# Patient Record
Sex: MALE | Race: WHITE | NOT HISPANIC OR LATINO | Employment: STUDENT | URBAN - METROPOLITAN AREA
[De-identification: names, ages, dates, MRNs, and addresses within clinical notes are randomized per-mention and may not be internally consistent; named-entity substitution may affect disease eponyms.]

---

## 2018-01-01 ENCOUNTER — EXTERNAL RECORD (OUTPATIENT)
Dept: OTHER | Age: 23
End: 2018-01-01

## 2018-07-30 ENCOUNTER — TELEPHONE (OUTPATIENT)
Dept: CARDIOLOGY | Age: 23
End: 2018-07-30

## 2018-07-31 ENCOUNTER — HISTORICAL DOCUMENTS (OUTPATIENT)
Dept: OTHER | Age: 23
End: 2018-07-31

## 2018-08-02 ENCOUNTER — CLINICAL ABSTRACT (OUTPATIENT)
Dept: ELECTROPHYSIOLOGY | Age: 23
End: 2018-08-02

## 2018-08-06 ENCOUNTER — TELEPHONE (OUTPATIENT)
Dept: ELECTROPHYSIOLOGY | Age: 23
End: 2018-08-06

## 2018-08-07 ENCOUNTER — OFFICE VISIT (OUTPATIENT)
Dept: ELECTROPHYSIOLOGY | Age: 23
End: 2018-08-07
Attending: INTERNAL MEDICINE

## 2018-08-07 VITALS
WEIGHT: 133.6 LBS | DIASTOLIC BLOOD PRESSURE: 82 MMHG | OXYGEN SATURATION: 97 % | HEART RATE: 75 BPM | HEIGHT: 64 IN | BODY MASS INDEX: 22.81 KG/M2 | SYSTOLIC BLOOD PRESSURE: 102 MMHG

## 2018-08-07 DIAGNOSIS — G90.A POTS (POSTURAL ORTHOSTATIC TACHYCARDIA SYNDROME): Primary | ICD-10-CM

## 2018-08-07 PROCEDURE — 99204 OFFICE O/P NEW MOD 45 MIN: CPT | Performed by: INTERNAL MEDICINE

## 2018-08-07 PROCEDURE — 93010 ELECTROCARDIOGRAM REPORT: CPT | Performed by: INTERNAL MEDICINE

## 2018-08-07 PROCEDURE — 99211 OFF/OP EST MAY X REQ PHY/QHP: CPT | Performed by: INTERNAL MEDICINE

## 2018-08-07 RX ORDER — DICYCLOMINE HCL 20 MG
20 TABLET ORAL EVERY 6 HOURS PRN
COMMUNITY
End: 2020-04-15 | Stop reason: ALTCHOICE

## 2018-08-07 RX ORDER — ONDANSETRON 4 MG/1
4 TABLET, ORALLY DISINTEGRATING ORAL EVERY 8 HOURS PRN
COMMUNITY
End: 2021-09-13

## 2018-08-07 RX ORDER — ACETAMINOPHEN 500 MG
1000 TABLET ORAL EVERY 6 HOURS PRN
COMMUNITY

## 2018-08-07 RX ORDER — PANTOPRAZOLE SODIUM 40 MG/1
40 TABLET, DELAYED RELEASE ORAL 2 TIMES DAILY
COMMUNITY
End: 2020-04-15 | Stop reason: ALTCHOICE

## 2018-08-07 RX ORDER — ACEBUTOLOL HYDROCHLORIDE 200 MG/1
200 CAPSULE ORAL DAILY
COMMUNITY
End: 2018-08-07 | Stop reason: ALTCHOICE

## 2018-08-23 ENCOUNTER — APPOINTMENT (OUTPATIENT)
Dept: CARDIOLOGY | Age: 23
End: 2018-08-23
Attending: INTERNAL MEDICINE

## 2018-08-24 ENCOUNTER — HOSPITAL ENCOUNTER (OUTPATIENT)
Age: 23
Discharge: HOME | End: 2018-08-24
Attending: EMERGENCY MEDICINE

## 2018-08-24 VITALS
DIASTOLIC BLOOD PRESSURE: 87 MMHG | WEIGHT: 136.69 LBS | OXYGEN SATURATION: 98 % | RESPIRATION RATE: 16 BRPM | BODY MASS INDEX: 23.34 KG/M2 | HEART RATE: 62 BPM | TEMPERATURE: 98.6 F | HEIGHT: 64 IN | SYSTOLIC BLOOD PRESSURE: 135 MMHG

## 2018-08-24 DIAGNOSIS — R35.0 URINARY FREQUENCY: Primary | ICD-10-CM

## 2018-08-24 LAB
APPEARANCE UR: CLEAR
BILIRUB UR QL STRIP: NEGATIVE
COLOR UR: YELLOW
GLUCOSE BLDC GLUCOMTR-MCNC: 96 MG/DL (ref 65–99)
GLUCOSE UR STRIP-MCNC: NEGATIVE MG/DL
HGB UR QL STRIP: NEGATIVE
KETONES UR STRIP-MCNC: NEGATIVE MG/DL
LEUKOCYTE ESTERASE UR QL STRIP: NEGATIVE
NITRITE UR QL STRIP: NEGATIVE
PH UR STRIP: 7.5 UNITS (ref 5–7)
PROT UR STRIP-MCNC: NEGATIVE MG/DL
SP GR UR STRIP: 1.01 (ref 1–1.03)
SPECIMEN SOURCE: ABNORMAL
UROBILINOGEN UR STRIP-MCNC: 1 MG/DL (ref 0–1)

## 2018-08-24 PROCEDURE — 99211 OFF/OP EST MAY X REQ PHY/QHP: CPT

## 2018-08-24 PROCEDURE — 81003 URINALYSIS AUTO W/O SCOPE: CPT

## 2018-08-24 PROCEDURE — 99202 OFFICE O/P NEW SF 15 MIN: CPT | Performed by: EMERGENCY MEDICINE

## 2018-08-24 PROCEDURE — 82962 GLUCOSE BLOOD TEST: CPT

## 2018-08-24 ASSESSMENT — ENCOUNTER SYMPTOMS
VOMITING: 0
ADENOPATHY: 0
NAUSEA: 0
HEADACHES: 0
SORE THROAT: 0
DIARRHEA: 0
DIZZINESS: 0
CONFUSION: 0
COUGH: 0
SHORTNESS OF BREATH: 0
ABDOMINAL PAIN: 0
FEVER: 0

## 2018-08-24 ASSESSMENT — PAIN SCALES - GENERAL
PAINLEVEL_OUTOF10: 0
PAINLEVEL_OUTOF10: 0

## 2018-09-07 ENCOUNTER — TELEPHONE (OUTPATIENT)
Dept: ELECTROPHYSIOLOGY | Age: 23
End: 2018-09-07

## 2018-09-24 ENCOUNTER — TELEPHONE (OUTPATIENT)
Dept: ELECTROPHYSIOLOGY | Age: 23
End: 2018-09-24

## 2018-09-24 RX ORDER — FLUDROCORTISONE ACETATE 0.1 MG/1
0.1 TABLET ORAL DAILY
Qty: 30 TABLET | Refills: 5 | Status: SHIPPED | OUTPATIENT
Start: 2018-09-24 | End: 2019-03-11 | Stop reason: SDUPTHER

## 2018-10-22 ENCOUNTER — WALK IN (OUTPATIENT)
Dept: URGENT CARE | Age: 23
End: 2018-10-22

## 2018-10-22 DIAGNOSIS — R30.0 DYSURIA: ICD-10-CM

## 2018-10-22 DIAGNOSIS — N39.0 ACUTE UTI: Primary | ICD-10-CM

## 2018-10-22 LAB
APPEARANCE UR: NORMAL
BILIRUB UR QL: NORMAL
COLOR UR: NORMAL
GLUCOSE UR-MCNC: NORMAL MG/DL
HGB UR QL: NORMAL
KETONES UR-MCNC: NORMAL MG/DL
LEUKOCYTE ESTERASE UR QL STRIP: NORMAL
NITRITE UR QL: NORMAL
PH UR: 6 [PH]
PROT UR QL: NORMAL
SP GR UR: 1.01
SPECIMEN SOURCE: NORMAL
UROBILINOGEN UR QL: 0.2

## 2018-10-22 PROCEDURE — 81002 URINALYSIS NONAUTO W/O SCOPE: CPT | Performed by: FAMILY MEDICINE

## 2018-10-22 PROCEDURE — 87086 URINE CULTURE/COLONY COUNT: CPT | Performed by: INTERNAL MEDICINE

## 2018-10-22 PROCEDURE — 99213 OFFICE O/P EST LOW 20 MIN: CPT | Performed by: FAMILY MEDICINE

## 2018-10-22 RX ORDER — SULFAMETHOXAZOLE AND TRIMETHOPRIM 800; 160 MG/1; MG/1
1 TABLET ORAL 2 TIMES DAILY
Qty: 20 TABLET | Refills: 0 | Status: SHIPPED | OUTPATIENT
Start: 2018-10-22 | End: 2019-01-08 | Stop reason: ALTCHOICE

## 2018-10-22 ASSESSMENT — PAIN SCALES - GENERAL: PAINLEVEL: 3-4

## 2018-10-24 ENCOUNTER — TELEPHONE (OUTPATIENT)
Dept: URGENT CARE | Age: 23
End: 2018-10-24

## 2018-10-24 LAB
BACTERIA UR CULT: NORMAL
REPORT STATUS (RPT): NORMAL
SPECIMEN SOURCE: NORMAL

## 2019-01-01 ENCOUNTER — EXTERNAL RECORD (OUTPATIENT)
Dept: OTHER | Age: 24
End: 2019-01-01

## 2019-01-07 ENCOUNTER — CLINICAL ABSTRACT (OUTPATIENT)
Dept: ELECTROPHYSIOLOGY | Age: 24
End: 2019-01-07

## 2019-01-08 ENCOUNTER — OFFICE VISIT (OUTPATIENT)
Dept: ELECTROPHYSIOLOGY | Age: 24
End: 2019-01-08
Attending: INTERNAL MEDICINE

## 2019-01-08 VITALS
HEART RATE: 79 BPM | OXYGEN SATURATION: 97 % | BODY MASS INDEX: 23.05 KG/M2 | HEIGHT: 64 IN | SYSTOLIC BLOOD PRESSURE: 116 MMHG | WEIGHT: 135 LBS | DIASTOLIC BLOOD PRESSURE: 86 MMHG

## 2019-01-08 DIAGNOSIS — G90.A POTS (POSTURAL ORTHOSTATIC TACHYCARDIA SYNDROME): ICD-10-CM

## 2019-01-08 PROCEDURE — 99214 OFFICE O/P EST MOD 30 MIN: CPT | Performed by: INTERNAL MEDICINE

## 2019-01-08 PROCEDURE — 99211 OFF/OP EST MAY X REQ PHY/QHP: CPT | Performed by: INTERNAL MEDICINE

## 2019-01-08 RX ORDER — POLYETHYLENE GLYCOL 3350 17 G/17G
17 POWDER, FOR SOLUTION ORAL 2 TIMES DAILY
COMMUNITY
End: 2020-04-15

## 2019-03-04 ENCOUNTER — E-ADVICE (OUTPATIENT)
Dept: ELECTROPHYSIOLOGY | Age: 24
End: 2019-03-04

## 2019-03-04 DIAGNOSIS — R60.0 LOWER EXTREMITY EDEMA: Primary | ICD-10-CM

## 2019-03-04 DIAGNOSIS — R06.01 ORTHOPNEA: ICD-10-CM

## 2019-03-11 ENCOUNTER — E-ADVICE (OUTPATIENT)
Dept: ELECTROPHYSIOLOGY | Age: 24
End: 2019-03-11

## 2019-03-11 RX ORDER — FLUDROCORTISONE ACETATE 0.1 MG/1
0.05 TABLET ORAL DAILY
Qty: 15 TABLET | Refills: 5 | Status: SHIPPED | OUTPATIENT
Start: 2019-03-11 | End: 2019-06-18 | Stop reason: ALTCHOICE

## 2019-03-12 ENCOUNTER — TELEPHONE (OUTPATIENT)
Dept: CARDIOLOGY | Age: 24
End: 2019-03-12

## 2019-03-13 ENCOUNTER — TELEPHONE (OUTPATIENT)
Dept: ELECTROPHYSIOLOGY | Age: 24
End: 2019-03-13

## 2019-03-20 ENCOUNTER — E-ADVICE (OUTPATIENT)
Dept: ELECTROPHYSIOLOGY | Age: 24
End: 2019-03-20

## 2019-03-31 ENCOUNTER — WALK IN (OUTPATIENT)
Dept: URGENT CARE | Age: 24
End: 2019-03-31

## 2019-03-31 VITALS
HEART RATE: 90 BPM | HEIGHT: 64 IN | TEMPERATURE: 98.4 F | RESPIRATION RATE: 18 BRPM | BODY MASS INDEX: 23.39 KG/M2 | DIASTOLIC BLOOD PRESSURE: 82 MMHG | WEIGHT: 137 LBS | OXYGEN SATURATION: 99 % | SYSTOLIC BLOOD PRESSURE: 110 MMHG

## 2019-03-31 DIAGNOSIS — F07.81 POST CONCUSSION SYNDROME: Primary | ICD-10-CM

## 2019-03-31 PROCEDURE — 99213 OFFICE O/P EST LOW 20 MIN: CPT | Performed by: PHYSICIAN ASSISTANT

## 2019-04-10 ENCOUNTER — WALK IN (OUTPATIENT)
Dept: URGENT CARE | Age: 24
End: 2019-04-10

## 2019-04-10 DIAGNOSIS — F07.81 POSTCONCUSSION SYNDROME: Primary | ICD-10-CM

## 2019-04-10 PROCEDURE — 99214 OFFICE O/P EST MOD 30 MIN: CPT | Performed by: FAMILY MEDICINE

## 2019-04-10 ASSESSMENT — PAIN SCALES - GENERAL: PAINLEVEL: 5-6

## 2019-04-10 ASSESSMENT — ENCOUNTER SYMPTOMS: HEADACHES: 1

## 2019-04-12 ENCOUNTER — HOSPITAL ENCOUNTER (OUTPATIENT)
Dept: REHABILITATION | Age: 24
Discharge: STILL ADMITTED OR ADMINISTRATIVE | End: 2019-04-12
Attending: FAMILY MEDICINE

## 2019-04-12 ENCOUNTER — CLINICAL ABSTRACT (OUTPATIENT)
Dept: ELECTROPHYSIOLOGY | Age: 24
End: 2019-04-12

## 2019-04-12 DIAGNOSIS — F07.81 POSTCONCUSSION SYNDROME: ICD-10-CM

## 2019-04-12 PROCEDURE — 10004173 HB COUNTER-THERAPY VISIT PT: Performed by: PHYSICAL THERAPIST

## 2019-04-12 PROCEDURE — 97162 PT EVAL MOD COMPLEX 30 MIN: CPT | Performed by: PHYSICAL THERAPIST

## 2019-04-12 PROCEDURE — 97140 MANUAL THERAPY 1/> REGIONS: CPT | Performed by: PHYSICAL THERAPIST

## 2019-04-12 PROCEDURE — 97110 THERAPEUTIC EXERCISES: CPT | Performed by: PHYSICAL THERAPIST

## 2019-04-18 ENCOUNTER — HOSPITAL ENCOUNTER (OUTPATIENT)
Dept: REHABILITATION | Age: 24
Discharge: STILL ADMITTED OR ADMINISTRATIVE | End: 2019-04-18
Attending: FAMILY MEDICINE

## 2019-04-18 PROCEDURE — 97112 NEUROMUSCULAR REEDUCATION: CPT | Performed by: PHYSICAL THERAPIST

## 2019-04-18 PROCEDURE — 10004173 HB COUNTER-THERAPY VISIT PT: Performed by: PHYSICAL THERAPIST

## 2019-04-18 PROCEDURE — 97140 MANUAL THERAPY 1/> REGIONS: CPT | Performed by: PHYSICAL THERAPIST

## 2019-04-19 ENCOUNTER — OFFICE VISIT (OUTPATIENT)
Dept: ELECTROPHYSIOLOGY | Age: 24
End: 2019-04-19
Attending: INTERNAL MEDICINE

## 2019-04-19 VITALS
HEIGHT: 64 IN | BODY MASS INDEX: 23.76 KG/M2 | HEART RATE: 100 BPM | DIASTOLIC BLOOD PRESSURE: 78 MMHG | SYSTOLIC BLOOD PRESSURE: 112 MMHG | WEIGHT: 139.2 LBS

## 2019-04-19 DIAGNOSIS — G90.A POTS (POSTURAL ORTHOSTATIC TACHYCARDIA SYNDROME): ICD-10-CM

## 2019-04-19 PROCEDURE — 99213 OFFICE O/P EST LOW 20 MIN: CPT | Performed by: INTERNAL MEDICINE

## 2019-04-19 PROCEDURE — 99211 OFF/OP EST MAY X REQ PHY/QHP: CPT | Performed by: INTERNAL MEDICINE

## 2019-04-24 ENCOUNTER — APPOINTMENT (OUTPATIENT)
Dept: REHABILITATION | Age: 24
End: 2019-04-24
Attending: FAMILY MEDICINE

## 2019-05-03 ENCOUNTER — HOSPITAL ENCOUNTER (OUTPATIENT)
Dept: REHABILITATION | Age: 24
Discharge: STILL ADMITTED OR ADMINISTRATIVE | End: 2019-05-03
Attending: FAMILY MEDICINE

## 2019-05-03 PROCEDURE — 97140 MANUAL THERAPY 1/> REGIONS: CPT | Performed by: PHYSICAL THERAPIST

## 2019-05-03 PROCEDURE — 97112 NEUROMUSCULAR REEDUCATION: CPT | Performed by: PHYSICAL THERAPIST

## 2019-05-03 PROCEDURE — 10004173 HB COUNTER-THERAPY VISIT PT: Performed by: PHYSICAL THERAPIST

## 2019-05-10 ENCOUNTER — HOSPITAL ENCOUNTER (OUTPATIENT)
Dept: REHABILITATION | Age: 24
Discharge: STILL ADMITTED OR ADMINISTRATIVE | End: 2019-05-10
Attending: FAMILY MEDICINE

## 2019-05-10 PROCEDURE — 97140 MANUAL THERAPY 1/> REGIONS: CPT | Performed by: PHYSICAL THERAPIST

## 2019-05-10 PROCEDURE — 97110 THERAPEUTIC EXERCISES: CPT | Performed by: PHYSICAL THERAPIST

## 2019-05-10 PROCEDURE — 10004173 HB COUNTER-THERAPY VISIT PT: Performed by: PHYSICAL THERAPIST

## 2019-06-13 ENCOUNTER — E-ADVICE (OUTPATIENT)
Dept: ELECTROPHYSIOLOGY | Age: 24
End: 2019-06-13

## 2019-06-17 ENCOUNTER — E-ADVICE (OUTPATIENT)
Dept: ELECTROPHYSIOLOGY | Age: 24
End: 2019-06-17

## 2019-06-17 RX ORDER — MIDODRINE HYDROCHLORIDE 2.5 MG/1
2.5 TABLET ORAL 3 TIMES DAILY
Qty: 90 TABLET | Refills: 0 | Status: SHIPPED | OUTPATIENT
Start: 2019-06-17 | End: 2019-06-27 | Stop reason: SDUPTHER

## 2019-06-27 RX ORDER — MIDODRINE HYDROCHLORIDE 2.5 MG/1
2.5 TABLET ORAL 3 TIMES DAILY
Qty: 90 TABLET | Refills: 6 | Status: SHIPPED | OUTPATIENT
Start: 2019-06-27 | End: 2020-05-18 | Stop reason: SDUPTHER

## 2019-07-30 ENCOUNTER — TRANSFERRED RECORDS (OUTPATIENT)
Dept: HEALTH INFORMATION MANAGEMENT | Facility: CLINIC | Age: 24
End: 2019-07-30

## 2019-08-12 ENCOUNTER — TRANSFERRED RECORDS (OUTPATIENT)
Dept: HEALTH INFORMATION MANAGEMENT | Facility: CLINIC | Age: 24
End: 2019-08-12

## 2019-08-21 ENCOUNTER — TELEPHONE (OUTPATIENT)
Dept: ELECTROPHYSIOLOGY | Age: 24
End: 2019-08-21

## 2019-09-05 ENCOUNTER — TELEPHONE (OUTPATIENT)
Dept: ELECTROPHYSIOLOGY | Age: 24
End: 2019-09-05

## 2019-11-27 ENCOUNTER — OFFICE VISIT (OUTPATIENT)
Dept: ELECTROPHYSIOLOGY | Age: 24
End: 2019-11-27
Attending: NURSE PRACTITIONER

## 2019-11-27 VITALS
HEIGHT: 64 IN | SYSTOLIC BLOOD PRESSURE: 108 MMHG | WEIGHT: 142.6 LBS | DIASTOLIC BLOOD PRESSURE: 60 MMHG | HEART RATE: 91 BPM | BODY MASS INDEX: 24.34 KG/M2 | OXYGEN SATURATION: 97 %

## 2019-11-27 DIAGNOSIS — R55 SYNCOPE, UNSPECIFIED SYNCOPE TYPE: ICD-10-CM

## 2019-11-27 PROCEDURE — 99211 OFF/OP EST MAY X REQ PHY/QHP: CPT | Performed by: NURSE PRACTITIONER

## 2019-11-27 PROCEDURE — 93005 ELECTROCARDIOGRAM TRACING: CPT | Performed by: NURSE PRACTITIONER

## 2019-11-27 PROCEDURE — 99214 OFFICE O/P EST MOD 30 MIN: CPT | Performed by: NURSE PRACTITIONER

## 2019-11-27 PROCEDURE — 93010 ELECTROCARDIOGRAM REPORT: CPT | Performed by: NURSE PRACTITIONER

## 2019-11-27 RX ORDER — CIPROFLOXACIN 500 MG/1
500 TABLET, FILM COATED ORAL 2 TIMES DAILY
COMMUNITY
Start: 2019-11-27 | End: 2020-04-15

## 2019-11-27 RX ORDER — VANCOMYCIN HYDROCHLORIDE 125 MG/1
125 CAPSULE ORAL 4 TIMES DAILY
COMMUNITY
Start: 2019-11-27 | End: 2019-12-11

## 2019-11-27 RX ORDER — CHOLESTYRAMINE LIGHT 4 G/5.7G
4 POWDER, FOR SUSPENSION ORAL 2 TIMES DAILY WITH MEALS
COMMUNITY

## 2019-12-14 ENCOUNTER — IMAGING SERVICES (OUTPATIENT)
Dept: GENERAL RADIOLOGY | Age: 24
End: 2019-12-14
Attending: PHYSICIAN ASSISTANT

## 2019-12-14 ENCOUNTER — WALK IN (OUTPATIENT)
Dept: URGENT CARE | Age: 24
End: 2019-12-14

## 2019-12-14 ENCOUNTER — TELEPHONE (OUTPATIENT)
Dept: URGENT CARE | Age: 24
End: 2019-12-14

## 2019-12-14 VITALS
WEIGHT: 135 LBS | TEMPERATURE: 98.5 F | DIASTOLIC BLOOD PRESSURE: 80 MMHG | RESPIRATION RATE: 16 BRPM | HEIGHT: 64 IN | HEART RATE: 108 BPM | OXYGEN SATURATION: 97 % | BODY MASS INDEX: 23.05 KG/M2 | SYSTOLIC BLOOD PRESSURE: 120 MMHG

## 2019-12-14 DIAGNOSIS — R05.9 COUGH: ICD-10-CM

## 2019-12-14 DIAGNOSIS — J20.8 VIRAL BRONCHITIS: ICD-10-CM

## 2019-12-14 DIAGNOSIS — R05.9 COUGH: Primary | ICD-10-CM

## 2019-12-14 PROCEDURE — 99214 OFFICE O/P EST MOD 30 MIN: CPT | Performed by: PHYSICIAN ASSISTANT

## 2019-12-14 PROCEDURE — 71046 X-RAY EXAM CHEST 2 VIEWS: CPT | Performed by: RADIOLOGY

## 2019-12-14 RX ORDER — AZITHROMYCIN 250 MG/1
TABLET, FILM COATED ORAL
Qty: 6 TABLET | Refills: 0 | Status: SHIPPED | OUTPATIENT
Start: 2019-12-14 | End: 2019-12-19

## 2020-04-06 ENCOUNTER — E-ADVICE (OUTPATIENT)
Dept: ELECTROPHYSIOLOGY | Age: 25
End: 2020-04-06

## 2020-04-06 DIAGNOSIS — R00.2 PALPITATIONS: Primary | ICD-10-CM

## 2020-04-15 ENCOUNTER — HOSPITAL ENCOUNTER (OUTPATIENT)
Age: 25
Discharge: HOME | End: 2020-04-15
Attending: EMERGENCY MEDICINE

## 2020-04-15 VITALS
BODY MASS INDEX: 23.05 KG/M2 | HEIGHT: 64 IN | WEIGHT: 135 LBS | RESPIRATION RATE: 20 BRPM | TEMPERATURE: 98.8 F | HEART RATE: 93 BPM | OXYGEN SATURATION: 97 % | DIASTOLIC BLOOD PRESSURE: 69 MMHG | SYSTOLIC BLOOD PRESSURE: 134 MMHG

## 2020-04-15 DIAGNOSIS — L03.115 CELLULITIS OF RIGHT LOWER EXTREMITY: Primary | ICD-10-CM

## 2020-04-15 PROCEDURE — 99211 OFF/OP EST MAY X REQ PHY/QHP: CPT

## 2020-04-15 PROCEDURE — 99214 OFFICE O/P EST MOD 30 MIN: CPT | Performed by: EMERGENCY MEDICINE

## 2020-04-15 RX ORDER — VANCOMYCIN HYDROCHLORIDE 125 MG/1
CAPSULE ORAL
COMMUNITY
Start: 2020-03-30 | End: 2020-05-04

## 2020-04-15 ASSESSMENT — PAIN DESCRIPTION - PAIN TYPE: TYPE: ACUTE PAIN

## 2020-04-15 ASSESSMENT — PAIN SCALES - GENERAL: PAINLEVEL_OUTOF10: 0

## 2020-05-18 ENCOUNTER — TELEPHONE (OUTPATIENT)
Dept: ELECTROPHYSIOLOGY | Age: 25
End: 2020-05-18

## 2020-05-18 ENCOUNTER — E-ADVICE (OUTPATIENT)
Dept: ELECTROPHYSIOLOGY | Age: 25
End: 2020-05-18

## 2020-05-18 RX ORDER — MIDODRINE HYDROCHLORIDE 2.5 MG/1
2.5 TABLET ORAL 3 TIMES DAILY
Qty: 90 TABLET | Refills: 2 | Status: ON HOLD | OUTPATIENT
Start: 2020-05-18 | End: 2021-11-09 | Stop reason: SDUPTHER

## 2020-06-08 ENCOUNTER — HOSPITAL ENCOUNTER (OUTPATIENT)
Age: 25
Discharge: HOME | End: 2020-06-08
Attending: EMERGENCY MEDICINE

## 2020-06-08 VITALS
TEMPERATURE: 98.4 F | DIASTOLIC BLOOD PRESSURE: 79 MMHG | RESPIRATION RATE: 18 BRPM | WEIGHT: 135 LBS | HEART RATE: 75 BPM | OXYGEN SATURATION: 97 % | BODY MASS INDEX: 23.05 KG/M2 | SYSTOLIC BLOOD PRESSURE: 111 MMHG | HEIGHT: 64 IN

## 2020-06-08 DIAGNOSIS — J01.00 ACUTE NON-RECURRENT MAXILLARY SINUSITIS: Primary | ICD-10-CM

## 2020-06-08 PROCEDURE — 99211 OFF/OP EST MAY X REQ PHY/QHP: CPT

## 2020-06-08 PROCEDURE — 99213 OFFICE O/P EST LOW 20 MIN: CPT | Performed by: EMERGENCY MEDICINE

## 2020-06-08 RX ORDER — AZITHROMYCIN 500 MG/1
500 TABLET, FILM COATED ORAL DAILY
Qty: 5 TABLET | Refills: 0 | Status: SHIPPED | OUTPATIENT
Start: 2020-06-08 | End: 2021-09-13 | Stop reason: ALTCHOICE

## 2020-06-08 ASSESSMENT — PAIN SCALES - GENERAL
PAINLEVEL_OUTOF10: 5
PAINLEVEL_OUTOF10: 5

## 2020-06-08 ASSESSMENT — PAIN DESCRIPTION - PAIN TYPE: TYPE: ACUTE PAIN

## 2020-09-29 ENCOUNTER — APPOINTMENT (OUTPATIENT)
Dept: LAB | Age: 25
End: 2020-09-29
Attending: INTERNAL MEDICINE

## 2020-10-16 ENCOUNTER — TELEPHONE (OUTPATIENT)
Dept: ELECTROPHYSIOLOGY | Age: 25
End: 2020-10-16

## 2020-10-16 DIAGNOSIS — R00.2 PALPITATIONS: ICD-10-CM

## 2020-12-02 ENCOUNTER — CLINICAL ABSTRACT (OUTPATIENT)
Dept: HEALTH INFORMATION MANAGEMENT | Age: 25
End: 2020-12-02

## 2021-01-01 ENCOUNTER — EXTERNAL RECORD (OUTPATIENT)
Dept: OTHER | Age: 26
End: 2021-01-01

## 2021-03-18 ENCOUNTER — IMMUNIZATION (OUTPATIENT)
Dept: LAB | Age: 26
End: 2021-03-18

## 2021-03-18 DIAGNOSIS — Z23 NEED FOR VACCINATION: Primary | ICD-10-CM

## 2021-03-18 PROCEDURE — 91300 COVID 19 PFIZER-BIONTECH: CPT | Performed by: INTERNAL MEDICINE

## 2021-03-18 PROCEDURE — 0001A COVID 19 PFIZER-BIONTECH: CPT | Performed by: INTERNAL MEDICINE

## 2021-04-08 ENCOUNTER — IMMUNIZATION (OUTPATIENT)
Dept: LAB | Age: 26
End: 2021-04-08
Attending: PREVENTIVE MEDICINE

## 2021-04-08 DIAGNOSIS — Z23 NEED FOR VACCINATION: Primary | ICD-10-CM

## 2021-04-08 PROCEDURE — 91300 COVID 19 PFIZER-BIONTECH: CPT | Performed by: INTERNAL MEDICINE

## 2021-04-08 PROCEDURE — 0002A COVID 19 PFIZER-BIONTECH: CPT | Performed by: INTERNAL MEDICINE

## 2021-04-29 ENCOUNTER — LAB REQUISITION (OUTPATIENT)
Dept: LAB | Age: 26
End: 2021-04-29

## 2021-04-29 DIAGNOSIS — R19.7 DIARRHEA, UNSPECIFIED: ICD-10-CM

## 2021-04-29 PROCEDURE — 87493 C DIFF AMPLIFIED PROBE: CPT | Performed by: CLINICAL MEDICAL LABORATORY

## 2021-04-30 LAB — C DIFF TOX B TCDB STL QL NAA+PROBE: NOT DETECTED

## 2021-05-05 ENCOUNTER — TRANSFERRED RECORDS (OUTPATIENT)
Dept: HEALTH INFORMATION MANAGEMENT | Facility: CLINIC | Age: 26
End: 2021-05-05

## 2021-05-25 VITALS
HEIGHT: 64 IN | DIASTOLIC BLOOD PRESSURE: 74 MMHG | TEMPERATURE: 98.6 F | WEIGHT: 134.48 LBS | WEIGHT: 135 LBS | SYSTOLIC BLOOD PRESSURE: 118 MMHG | OXYGEN SATURATION: 95 % | HEART RATE: 88 BPM | RESPIRATION RATE: 16 BRPM | RESPIRATION RATE: 12 BRPM | HEIGHT: 64 IN | TEMPERATURE: 98.2 F | BODY MASS INDEX: 22.96 KG/M2 | HEART RATE: 103 BPM | DIASTOLIC BLOOD PRESSURE: 82 MMHG | OXYGEN SATURATION: 97 % | SYSTOLIC BLOOD PRESSURE: 100 MMHG | BODY MASS INDEX: 23.05 KG/M2

## 2021-07-22 ENCOUNTER — TELEPHONE (OUTPATIENT)
Dept: ELECTROPHYSIOLOGY | Age: 26
End: 2021-07-22

## 2021-07-23 PROBLEM — A49.8 RECURRENT CLOSTRIDIOIDES DIFFICILE INFECTION: Status: ACTIVE | Noted: 2021-07-23

## 2021-07-23 PROBLEM — K21.9 GERD (GASTROESOPHAGEAL REFLUX DISEASE): Status: ACTIVE | Noted: 2021-07-23

## 2021-07-23 PROBLEM — K58.9 IBS (IRRITABLE BOWEL SYNDROME): Status: ACTIVE | Noted: 2021-07-23

## 2021-07-23 PROBLEM — Z84.89 FAMILY HISTORY OF EARLY SUDDEN DEATH: Status: ACTIVE | Noted: 2021-07-23

## 2021-07-23 PROBLEM — K25.9 GASTRIC ULCER: Status: ACTIVE | Noted: 2021-07-23

## 2021-07-23 PROBLEM — K27.9 PUD (PEPTIC ULCER DISEASE): Status: ACTIVE | Noted: 2021-07-23

## 2021-07-23 PROBLEM — R00.2 PALPITATIONS: Status: ACTIVE | Noted: 2021-07-23

## 2021-07-23 PROBLEM — K76.0 NAFLD (NONALCOHOLIC FATTY LIVER DISEASE): Status: ACTIVE | Noted: 2021-07-23

## 2021-07-23 PROBLEM — F98.8 ADD (ATTENTION DEFICIT DISORDER): Status: ACTIVE | Noted: 2021-07-23

## 2021-07-23 PROBLEM — G90.A POTS (POSTURAL ORTHOSTATIC TACHYCARDIA SYNDROME): Status: ACTIVE | Noted: 2021-07-23

## 2021-07-23 PROBLEM — R94.31 SHORTENED PR INTERVAL: Status: ACTIVE | Noted: 2021-07-23

## 2021-07-27 ENCOUNTER — OFFICE VISIT (OUTPATIENT)
Dept: ELECTROPHYSIOLOGY | Age: 26
End: 2021-07-27
Attending: INTERNAL MEDICINE

## 2021-07-27 ENCOUNTER — PREP FOR CASE (OUTPATIENT)
Dept: ELECTROPHYSIOLOGY | Age: 26
End: 2021-07-27

## 2021-07-27 VITALS
WEIGHT: 135 LBS | HEART RATE: 92 BPM | HEIGHT: 63 IN | DIASTOLIC BLOOD PRESSURE: 70 MMHG | SYSTOLIC BLOOD PRESSURE: 128 MMHG | BODY MASS INDEX: 23.92 KG/M2

## 2021-07-27 DIAGNOSIS — G90.A POTS (POSTURAL ORTHOSTATIC TACHYCARDIA SYNDROME): Primary | ICD-10-CM

## 2021-07-27 DIAGNOSIS — Z82.41 FAMILY HISTORY OF SUDDEN CARDIAC DEATH: ICD-10-CM

## 2021-07-27 DIAGNOSIS — I47.20 VT (VENTRICULAR TACHYCARDIA) (CMD): ICD-10-CM

## 2021-07-27 DIAGNOSIS — I48.0 PAF (PAROXYSMAL ATRIAL FIBRILLATION) (CMD): Primary | ICD-10-CM

## 2021-07-27 DIAGNOSIS — I48.0 AF (PAROXYSMAL ATRIAL FIBRILLATION) (CMD): ICD-10-CM

## 2021-07-27 DIAGNOSIS — R94.31 SHORTENED PR INTERVAL: ICD-10-CM

## 2021-07-27 DIAGNOSIS — R55 SYNCOPE, UNSPECIFIED SYNCOPE TYPE: ICD-10-CM

## 2021-07-27 DIAGNOSIS — R00.2 PALPITATIONS: ICD-10-CM

## 2021-07-27 PROBLEM — R00.0 WIDE-COMPLEX TACHYCARDIA: Status: ACTIVE | Noted: 2021-07-27

## 2021-07-27 PROCEDURE — 99204 OFFICE O/P NEW MOD 45 MIN: CPT | Performed by: INTERNAL MEDICINE

## 2021-07-27 PROCEDURE — 99212 OFFICE O/P EST SF 10 MIN: CPT

## 2021-07-27 RX ORDER — FLECAINIDE ACETATE 100 MG/1
100 TABLET ORAL 2 TIMES DAILY
Qty: 180 TABLET | Refills: 1 | Status: SHIPPED | OUTPATIENT
Start: 2021-07-27 | End: 2022-04-12 | Stop reason: DRUGHIGH

## 2021-07-27 ASSESSMENT — ENCOUNTER SYMPTOMS
FATIGUE: 0
FEVER: 0
LIGHT-HEADEDNESS: 0
SLEEP DISTURBANCE: 0
SHORTNESS OF BREATH: 1
WOUND: 0
BACK PAIN: 0
DIARRHEA: 1
BLOOD IN STOOL: 0
CHILLS: 0
NERVOUS/ANXIOUS: 0
DIZZINESS: 0

## 2021-07-28 ENCOUNTER — OFFICE VISIT (OUTPATIENT)
Dept: CARDIOLOGY | Age: 26
End: 2021-07-28
Attending: INTERNAL MEDICINE

## 2021-07-28 ENCOUNTER — APPOINTMENT (OUTPATIENT)
Dept: LAB | Age: 26
End: 2021-07-28
Attending: INTERNAL MEDICINE

## 2021-07-28 ENCOUNTER — TRANSFERRED RECORDS (OUTPATIENT)
Dept: HEALTH INFORMATION MANAGEMENT | Facility: CLINIC | Age: 26
End: 2021-07-28

## 2021-07-28 DIAGNOSIS — Z84.89 FAMILY HISTORY OF EARLY SUDDEN DEATH: ICD-10-CM

## 2021-07-28 DIAGNOSIS — I48.0 AF (PAROXYSMAL ATRIAL FIBRILLATION) (CMD): ICD-10-CM

## 2021-07-28 DIAGNOSIS — G90.A POTS (POSTURAL ORTHOSTATIC TACHYCARDIA SYNDROME): ICD-10-CM

## 2021-07-28 DIAGNOSIS — R00.2 PALPITATIONS: ICD-10-CM

## 2021-07-28 DIAGNOSIS — R00.0 WIDE-COMPLEX TACHYCARDIA: ICD-10-CM

## 2021-07-28 DIAGNOSIS — R94.31 SHORTENED PR INTERVAL: Primary | ICD-10-CM

## 2021-07-28 DIAGNOSIS — K58.0 IRRITABLE BOWEL SYNDROME WITH DIARRHEA: ICD-10-CM

## 2021-07-28 LAB — DIAGNOSTIC TESTING SUMMARY: ABNORMAL

## 2021-07-28 PROCEDURE — 99205 OFFICE O/P NEW HI 60 MIN: CPT | Performed by: INTERNAL MEDICINE

## 2021-07-28 ASSESSMENT — ENCOUNTER SYMPTOMS
DIZZINESS: 0
LIGHT-HEADEDNESS: 0
SHORTNESS OF BREATH: 0
FATIGUE: 0

## 2021-07-29 ENCOUNTER — CLINICAL ABSTRACT (OUTPATIENT)
Dept: HEALTH INFORMATION MANAGEMENT | Age: 26
End: 2021-07-29

## 2021-07-29 ENCOUNTER — TELEPHONE (OUTPATIENT)
Dept: ELECTROPHYSIOLOGY | Age: 26
End: 2021-07-29

## 2021-08-13 ENCOUNTER — HOSPITAL ENCOUNTER (OUTPATIENT)
Dept: MRI IMAGING | Age: 26
Discharge: HOME | End: 2021-08-13
Attending: INTERNAL MEDICINE

## 2021-08-13 DIAGNOSIS — Z82.41 FAMILY HISTORY OF SUDDEN CARDIAC DEATH: ICD-10-CM

## 2021-08-13 DIAGNOSIS — R94.31 SHORTENED PR INTERVAL: ICD-10-CM

## 2021-08-13 LAB — HCT VFR BLD CALC: 47.2 % (ref 39–51)

## 2021-08-13 PROCEDURE — G1004 CDSM NDSC: HCPCS

## 2021-08-13 PROCEDURE — G1004 CDSM NDSC: HCPCS | Performed by: RADIOLOGY

## 2021-08-13 PROCEDURE — 85014 HEMATOCRIT: CPT | Performed by: RADIOLOGY

## 2021-08-13 PROCEDURE — A9585 GADOBUTROL INJECTION: HCPCS | Performed by: INTERNAL MEDICINE

## 2021-08-13 PROCEDURE — 75561 CARDIAC MRI FOR MORPH W/DYE: CPT | Performed by: RADIOLOGY

## 2021-08-13 PROCEDURE — 10002805 HB CONTRAST AGENT: Performed by: INTERNAL MEDICINE

## 2021-08-13 PROCEDURE — 75565 CARD MRI VELOC FLOW MAPPING: CPT

## 2021-08-13 PROCEDURE — 75565 CARD MRI VELOC FLOW MAPPING: CPT | Performed by: RADIOLOGY

## 2021-08-13 RX ORDER — GADOBUTROL 604.72 MG/ML
12 INJECTION INTRAVENOUS ONCE
Status: COMPLETED | OUTPATIENT
Start: 2021-08-13 | End: 2021-08-13

## 2021-08-13 RX ADMIN — GADOBUTROL 12 ML: 604.72 INJECTION INTRAVENOUS at 15:45

## 2021-08-31 ENCOUNTER — TELEPHONE (OUTPATIENT)
Dept: ELECTROPHYSIOLOGY | Age: 26
End: 2021-08-31

## 2021-08-31 DIAGNOSIS — Z79.899 HIGH RISK MEDICATION USE: Primary | ICD-10-CM

## 2021-09-02 PROBLEM — Z79.899 HIGH RISK MEDICATION USE: Status: ACTIVE | Noted: 2021-09-02

## 2021-09-07 ENCOUNTER — OFFICE VISIT (OUTPATIENT)
Dept: ELECTROPHYSIOLOGY | Age: 26
End: 2021-09-07
Attending: INTERNAL MEDICINE

## 2021-09-07 VITALS
HEART RATE: 72 BPM | DIASTOLIC BLOOD PRESSURE: 64 MMHG | BODY MASS INDEX: 23.92 KG/M2 | WEIGHT: 135 LBS | SYSTOLIC BLOOD PRESSURE: 100 MMHG | HEIGHT: 63 IN

## 2021-09-07 DIAGNOSIS — I48.0 PAF (PAROXYSMAL ATRIAL FIBRILLATION) (CMD): ICD-10-CM

## 2021-09-07 DIAGNOSIS — R94.31 SHORTENED PR INTERVAL: ICD-10-CM

## 2021-09-07 DIAGNOSIS — R55 SYNCOPE: ICD-10-CM

## 2021-09-07 DIAGNOSIS — R00.2 PALPITATIONS: ICD-10-CM

## 2021-09-07 DIAGNOSIS — G90.A POTS (POSTURAL ORTHOSTATIC TACHYCARDIA SYNDROME): ICD-10-CM

## 2021-09-07 DIAGNOSIS — R00.0 WIDE-COMPLEX TACHYCARDIA: Primary | ICD-10-CM

## 2021-09-07 DIAGNOSIS — Z79.899 HIGH RISK MEDICATION USE: ICD-10-CM

## 2021-09-07 PROCEDURE — 93041 RHYTHM ECG TRACING: CPT | Performed by: INTERNAL MEDICINE

## 2021-09-07 PROCEDURE — 99212 OFFICE O/P EST SF 10 MIN: CPT

## 2021-09-07 PROCEDURE — 99214 OFFICE O/P EST MOD 30 MIN: CPT | Performed by: INTERNAL MEDICINE

## 2021-09-07 RX ORDER — PROMETHAZINE HYDROCHLORIDE 12.5 MG/1
12.5 TABLET ORAL EVERY 6 HOURS PRN
COMMUNITY

## 2021-09-07 ASSESSMENT — ENCOUNTER SYMPTOMS
CHILLS: 0
FATIGUE: 1
BLOOD IN STOOL: 0
DIARRHEA: 1
SLEEP DISTURBANCE: 0
FEVER: 0
DIZZINESS: 0
SHORTNESS OF BREATH: 0
LIGHT-HEADEDNESS: 0

## 2021-09-08 ENCOUNTER — OFFICE VISIT (OUTPATIENT)
Dept: CARDIOLOGY | Age: 26
End: 2021-09-08
Attending: INTERNAL MEDICINE

## 2021-09-08 DIAGNOSIS — Z79.899 HIGH RISK MEDICATION USE: ICD-10-CM

## 2021-09-08 DIAGNOSIS — K58.0 IRRITABLE BOWEL SYNDROME WITH DIARRHEA: ICD-10-CM

## 2021-09-08 DIAGNOSIS — R00.2 PALPITATIONS: ICD-10-CM

## 2021-09-08 DIAGNOSIS — R00.0 WIDE-COMPLEX TACHYCARDIA: ICD-10-CM

## 2021-09-08 DIAGNOSIS — K21.9 GASTROESOPHAGEAL REFLUX DISEASE, UNSPECIFIED WHETHER ESOPHAGITIS PRESENT: ICD-10-CM

## 2021-09-08 DIAGNOSIS — Z84.89 FAMILY HISTORY OF EARLY SUDDEN DEATH: Primary | ICD-10-CM

## 2021-09-08 DIAGNOSIS — G90.A POTS (POSTURAL ORTHOSTATIC TACHYCARDIA SYNDROME): ICD-10-CM

## 2021-09-08 DIAGNOSIS — I48.0 AF (PAROXYSMAL ATRIAL FIBRILLATION) (CMD): ICD-10-CM

## 2021-09-08 PROCEDURE — 99213 OFFICE O/P EST LOW 20 MIN: CPT | Performed by: INTERNAL MEDICINE

## 2021-09-08 ASSESSMENT — ENCOUNTER SYMPTOMS
FATIGUE: 0
DIZZINESS: 0
SHORTNESS OF BREATH: 0
LIGHT-HEADEDNESS: 0

## 2021-09-13 ENCOUNTER — WALK IN (OUTPATIENT)
Dept: URGENT CARE | Age: 26
End: 2021-09-13

## 2021-09-13 VITALS — TEMPERATURE: 98.9 F | HEART RATE: 97 BPM | OXYGEN SATURATION: 97 % | RESPIRATION RATE: 16 BRPM

## 2021-09-13 DIAGNOSIS — J01.40 ACUTE NON-RECURRENT PANSINUSITIS: Primary | ICD-10-CM

## 2021-09-13 PROCEDURE — 99213 OFFICE O/P EST LOW 20 MIN: CPT | Performed by: PHYSICIAN ASSISTANT

## 2021-09-13 RX ORDER — CLINDAMYCIN HYDROCHLORIDE 300 MG/1
300 CAPSULE ORAL 3 TIMES DAILY
Qty: 30 CAPSULE | Refills: 0 | Status: SHIPPED | OUTPATIENT
Start: 2021-09-13 | End: 2021-09-23

## 2021-09-13 RX ORDER — CEFUROXIME AXETIL 500 MG/1
500 TABLET ORAL 2 TIMES DAILY
Qty: 20 TABLET | Refills: 0 | Status: SHIPPED | OUTPATIENT
Start: 2021-09-13 | End: 2021-09-23

## 2021-09-13 ASSESSMENT — ENCOUNTER SYMPTOMS
FEVER: 0
CHILLS: 0
SORE THROAT: 0
DIZZINESS: 1
SINUS PRESSURE: 1
COUGH: 0
HEADACHES: 1
SHORTNESS OF BREATH: 0

## 2021-09-17 ENCOUNTER — TRANSFERRED RECORDS (OUTPATIENT)
Dept: HEALTH INFORMATION MANAGEMENT | Facility: CLINIC | Age: 26
End: 2021-09-17

## 2021-09-21 ENCOUNTER — TELEPHONE (OUTPATIENT)
Dept: ELECTROPHYSIOLOGY | Age: 26
End: 2021-09-21

## 2021-10-20 ENCOUNTER — WALK IN (OUTPATIENT)
Dept: URGENT CARE | Age: 26
End: 2021-10-20

## 2021-10-20 VITALS
BODY MASS INDEX: 24.8 KG/M2 | HEART RATE: 98 BPM | HEIGHT: 63 IN | RESPIRATION RATE: 16 BRPM | DIASTOLIC BLOOD PRESSURE: 75 MMHG | SYSTOLIC BLOOD PRESSURE: 113 MMHG | TEMPERATURE: 98.3 F | WEIGHT: 140 LBS | OXYGEN SATURATION: 98 %

## 2021-10-20 DIAGNOSIS — J01.40 ACUTE PANSINUSITIS, RECURRENCE NOT SPECIFIED: Primary | ICD-10-CM

## 2021-10-20 PROCEDURE — 99213 OFFICE O/P EST LOW 20 MIN: CPT | Performed by: PHYSICIAN ASSISTANT

## 2021-10-20 RX ORDER — PREDNISONE 20 MG/1
20 TABLET ORAL DAILY
Qty: 3 TABLET | Refills: 0 | Status: SHIPPED | OUTPATIENT
Start: 2021-10-20 | End: 2021-10-23

## 2021-10-20 RX ORDER — CEFIXIME 400 MG/1
400 CAPSULE ORAL DAILY
Qty: 7 CAPSULE | Refills: 0 | Status: SHIPPED | OUTPATIENT
Start: 2021-10-20 | End: 2021-10-27

## 2021-10-20 RX ORDER — ELETRIPTAN HYDROBROMIDE 20 MG/1
TABLET, FILM COATED ORAL
COMMUNITY
Start: 2021-08-28

## 2021-10-20 RX ORDER — PANTOPRAZOLE SODIUM 40 MG/1
40 TABLET, DELAYED RELEASE ORAL 2 TIMES DAILY
COMMUNITY

## 2021-10-20 ASSESSMENT — ENCOUNTER SYMPTOMS
FEVER: 0
COUGH: 1
SHORTNESS OF BREATH: 0
SINUS PAIN: 1

## 2021-11-02 ENCOUNTER — E-ADVICE (OUTPATIENT)
Dept: ELECTROPHYSIOLOGY | Age: 26
End: 2021-11-02

## 2021-11-02 DIAGNOSIS — Z01.812 PRE-PROCEDURAL LABORATORY EXAMINATION: Primary | ICD-10-CM

## 2021-11-02 ASSESSMENT — ACTIVITIES OF DAILY LIVING (ADL)
ADL_BEFORE_ADMISSION: INDEPENDENT
SENSORY_SUPPORT_DEVICES: EYEGLASSES
ADL_SHORT_OF_BREATH: NO
ADL_SCORE: 12
DESCRIBE HOW PAIN IMPACTS YOUR LIFE: NONE/CAN MANAGE
RECENT_DECLINE_ADL: NO
CHRONIC_PAIN_PRESENT: YES, CHRONIC
HISTORY OF FALLING IN THE LAST YEAR (PRIOR TO ADMISSION): NO
NEEDS_ASSIST: NO

## 2021-11-02 ASSESSMENT — COGNITIVE AND FUNCTIONAL STATUS - GENERAL
ARE YOU BLIND OR DO YOU HAVE SERIOUS DIFFICULTY SEEING, EVEN WHEN WEARING GLASSES: NO
ARE YOU DEAF OR DO YOU HAVE SERIOUS DIFFICULTY  HEARING: NO

## 2021-11-04 DIAGNOSIS — Z11.52 ENCOUNTER FOR PREPROCEDURE SCREENING LABORATORY TESTING FOR COVID-19: ICD-10-CM

## 2021-11-04 DIAGNOSIS — I48.0 PAF (PAROXYSMAL ATRIAL FIBRILLATION) (CMD): Primary | ICD-10-CM

## 2021-11-04 DIAGNOSIS — Z01.812 ENCOUNTER FOR PREPROCEDURE SCREENING LABORATORY TESTING FOR COVID-19: ICD-10-CM

## 2021-11-04 RX ORDER — 0.9 % SODIUM CHLORIDE 0.9 %
2 VIAL (ML) INJECTION EVERY 12 HOURS SCHEDULED
Status: CANCELLED | OUTPATIENT
Start: 2021-11-04

## 2021-11-05 ENCOUNTER — APPOINTMENT (OUTPATIENT)
Dept: LAB | Age: 26
End: 2021-11-05
Attending: INTERNAL MEDICINE

## 2021-11-05 ENCOUNTER — LAB SERVICES (OUTPATIENT)
Dept: LAB | Age: 26
End: 2021-11-05
Attending: INTERNAL MEDICINE

## 2021-11-05 DIAGNOSIS — Z01.812 ENCOUNTER FOR PREPROCEDURE SCREENING LABORATORY TESTING FOR COVID-19: ICD-10-CM

## 2021-11-05 DIAGNOSIS — I48.0 PAF (PAROXYSMAL ATRIAL FIBRILLATION) (CMD): ICD-10-CM

## 2021-11-05 DIAGNOSIS — Z11.52 ENCOUNTER FOR PREPROCEDURE SCREENING LABORATORY TESTING FOR COVID-19: ICD-10-CM

## 2021-11-05 PROCEDURE — U0005 INFEC AGEN DETEC AMPLI PROBE: HCPCS

## 2021-11-05 PROCEDURE — 80048 BASIC METABOLIC PNL TOTAL CA: CPT

## 2021-11-05 PROCEDURE — 85025 COMPLETE CBC W/AUTO DIFF WBC: CPT

## 2021-11-05 PROCEDURE — 83735 ASSAY OF MAGNESIUM: CPT

## 2021-11-05 PROCEDURE — 36415 COLL VENOUS BLD VENIPUNCTURE: CPT

## 2021-11-06 LAB
ANION GAP SERPL CALC-SCNC: 6 MMOL/L (ref 10–20)
BASOPHILS # BLD: 0.1 K/MCL (ref 0–0.3)
BASOPHILS NFR BLD: 2 %
BUN SERPL-MCNC: 12 MG/DL (ref 6–20)
BUN/CREAT SERPL: 14 (ref 7–25)
CALCIUM SERPL-MCNC: 9.5 MG/DL (ref 8.4–10.2)
CHLORIDE SERPL-SCNC: 105 MMOL/L (ref 98–107)
CO2 SERPL-SCNC: 31 MMOL/L (ref 21–32)
CREAT SERPL-MCNC: 0.84 MG/DL (ref 0.67–1.17)
DEPRECATED RDW RBC: 37.9 FL (ref 39–50)
EOSINOPHIL # BLD: 0.1 K/MCL (ref 0–0.5)
EOSINOPHIL NFR BLD: 3 %
ERYTHROCYTE [DISTWIDTH] IN BLOOD: 11.9 % (ref 11–15)
FASTING DURATION TIME PATIENT: ABNORMAL H
GFR SERPLBLD BASED ON 1.73 SQ M-ARVRAT: >90 ML/MIN
GLUCOSE SERPL-MCNC: 91 MG/DL (ref 70–99)
HCT VFR BLD CALC: 48.5 % (ref 39–51)
HGB BLD-MCNC: 16.3 G/DL (ref 13–17)
IMM GRANULOCYTES # BLD AUTO: 0 K/MCL (ref 0–0.2)
IMM GRANULOCYTES # BLD: 0 %
LYMPHOCYTES # BLD: 1.8 K/MCL (ref 1–4.8)
LYMPHOCYTES NFR BLD: 40 %
MAGNESIUM SERPL-MCNC: 2 MG/DL (ref 1.7–2.4)
MCH RBC QN AUTO: 29.6 PG (ref 26–34)
MCHC RBC AUTO-ENTMCNC: 33.6 G/DL (ref 32–36.5)
MCV RBC AUTO: 88 FL (ref 78–100)
MONOCYTES # BLD: 0.5 K/MCL (ref 0.3–0.9)
MONOCYTES NFR BLD: 10 %
NEUTROPHILS # BLD: 2.1 K/MCL (ref 1.8–7.7)
NEUTROPHILS NFR BLD: 45 %
NRBC BLD MANUAL-RTO: 0 /100 WBC
PLATELET # BLD AUTO: 325 K/MCL (ref 140–450)
POTASSIUM SERPL-SCNC: 4.4 MMOL/L (ref 3.4–5.1)
RBC # BLD: 5.51 MIL/MCL (ref 4.5–5.9)
SARS-COV-2 RNA RESP QL NAA+PROBE: NOT DETECTED
SERVICE CMNT-IMP: NORMAL
SERVICE CMNT-IMP: NORMAL
SODIUM SERPL-SCNC: 138 MMOL/L (ref 135–145)
WBC # BLD: 4.6 K/MCL (ref 4.2–11)

## 2021-11-08 ENCOUNTER — HOSPITAL ENCOUNTER (OUTPATIENT)
Age: 26
Discharge: HOME | End: 2021-11-09
Attending: INTERNAL MEDICINE | Admitting: INTERNAL MEDICINE

## 2021-11-08 ENCOUNTER — ANESTHESIA EVENT (OUTPATIENT)
Dept: CARDIOLOGY | Age: 26
End: 2021-11-08

## 2021-11-08 ENCOUNTER — TELEPHONE (OUTPATIENT)
Dept: ELECTROPHYSIOLOGY | Age: 26
End: 2021-11-08

## 2021-11-08 ENCOUNTER — ANESTHESIA (OUTPATIENT)
Dept: CARDIOLOGY | Age: 26
End: 2021-11-08

## 2021-11-08 DIAGNOSIS — Z79.899 HIGH RISK MEDICATION USE: ICD-10-CM

## 2021-11-08 DIAGNOSIS — I42.0 DILATED CARDIOMYOPATHY (CMD): ICD-10-CM

## 2021-11-08 DIAGNOSIS — I48.0 PAF (PAROXYSMAL ATRIAL FIBRILLATION) (CMD): ICD-10-CM

## 2021-11-08 DIAGNOSIS — R00.0 WIDE-COMPLEX TACHYCARDIA: ICD-10-CM

## 2021-11-08 LAB
ACTIVATED CLOTTING TIME LOW RANGE - POINT OF CARE: 213 SEC
ACTIVATED CLOTTING TIME LOW RANGE - POINT OF CARE: 219 SEC
ACTIVATED CLOTTING TIME LOW RANGE - POINT OF CARE: 298 SEC
ACTIVATED CLOTTING TIME LOW RANGE - POINT OF CARE: 314 SEC
ACTIVATED CLOTTING TIME LOW RANGE - POINT OF CARE: 355 SEC

## 2021-11-08 PROCEDURE — 10004369 HB CARDIAC ADD ON PROCEDURE: Performed by: INTERNAL MEDICINE

## 2021-11-08 PROCEDURE — 10002801 HB RX 250 W/O HCPCS: Performed by: ANESTHESIOLOGY

## 2021-11-08 PROCEDURE — 93623 PRGRMD STIMJ&PACG IV RX NFS: CPT | Performed by: INTERNAL MEDICINE

## 2021-11-08 PROCEDURE — 93613 INTRACARDIAC EPHYS 3D MAPG: CPT | Performed by: INTERNAL MEDICINE

## 2021-11-08 PROCEDURE — A93656 ANES COMP EVAL TRANSEPTAL CATH MULT CATHS ATRIAL: Performed by: ANESTHESIOLOGY

## 2021-11-08 PROCEDURE — 93662 INTRACARDIAC ECG (ICE): CPT | Performed by: INTERNAL MEDICINE

## 2021-11-08 PROCEDURE — 10004160 HB COUNTER-PRE PROC ENCOUNTER: Performed by: INTERNAL MEDICINE

## 2021-11-08 PROCEDURE — C1759 CATH, INTRA ECHOCARDIOGRAPHY: HCPCS | Performed by: INTERNAL MEDICINE

## 2021-11-08 PROCEDURE — 10002800 HB RX 250 W HCPCS: Performed by: ANESTHESIOLOGY

## 2021-11-08 PROCEDURE — C1894 INTRO/SHEATH, NON-LASER: HCPCS | Performed by: INTERNAL MEDICINE

## 2021-11-08 PROCEDURE — 10006023 HB SUPPLY 272: Performed by: INTERNAL MEDICINE

## 2021-11-08 PROCEDURE — 10004919 HB EP PROCEDURE LEVEL 4: Performed by: INTERNAL MEDICINE

## 2021-11-08 PROCEDURE — 10002800 HB RX 250 W HCPCS: Performed by: INTERNAL MEDICINE

## 2021-11-08 PROCEDURE — 10002767 HB EXTENDED RECOVERY PER HOUR

## 2021-11-08 PROCEDURE — 10004352 HB COUNTER-EP CASE: Performed by: INTERNAL MEDICINE

## 2021-11-08 PROCEDURE — 10004403 HB CARDIAC IMAGING: Performed by: INTERNAL MEDICINE

## 2021-11-08 PROCEDURE — 10002359 HB ANESTH GENERAL ADD'L 1/2 HR: Performed by: INTERNAL MEDICINE

## 2021-11-08 PROCEDURE — 10002807 HB RX 258: Performed by: ANESTHESIOLOGY

## 2021-11-08 PROCEDURE — 93657 TX L/R ATRIAL FIB ADDL: CPT | Performed by: INTERNAL MEDICINE

## 2021-11-08 PROCEDURE — 36620 INSERTION CATHETER ARTERY: CPT | Performed by: ANESTHESIOLOGY

## 2021-11-08 PROCEDURE — 10004651 HB RX, NO CHARGE ITEM: Performed by: INTERNAL MEDICINE

## 2021-11-08 PROCEDURE — C1730 CATH, EP, 19 OR FEW ELECT: HCPCS | Performed by: INTERNAL MEDICINE

## 2021-11-08 PROCEDURE — 10004452 HB PACU ADDL 30 MINUTES: Performed by: ANESTHESIOLOGY

## 2021-11-08 PROCEDURE — C1893 INTRO/SHEATH, FIXED,NON-PEEL: HCPCS | Performed by: INTERNAL MEDICINE

## 2021-11-08 PROCEDURE — 10002358 HB ANESTH GENERAL 1ST 1/2 HR: Performed by: INTERNAL MEDICINE

## 2021-11-08 PROCEDURE — 10002803 HB RX 637: Performed by: INTERNAL MEDICINE

## 2021-11-08 PROCEDURE — 10002801 HB RX 250 W/O HCPCS: Performed by: INTERNAL MEDICINE

## 2021-11-08 PROCEDURE — 93656 COMPRE EP EVAL ABLTJ ATR FIB: CPT | Performed by: INTERNAL MEDICINE

## 2021-11-08 PROCEDURE — 36620 INSERTION CATHETER ARTERY: CPT | Performed by: INTERNAL MEDICINE

## 2021-11-08 PROCEDURE — 85347 COAGULATION TIME ACTIVATED: CPT

## 2021-11-08 PROCEDURE — 10004571 HB COUNTER-HOLDING 30 MIN: Performed by: ANESTHESIOLOGY

## 2021-11-08 PROCEDURE — C1769 GUIDE WIRE: HCPCS | Performed by: INTERNAL MEDICINE

## 2021-11-08 PROCEDURE — 10004451 HB PACU RECOVERY 1ST 30 MINUTES: Performed by: ANESTHESIOLOGY

## 2021-11-08 PROCEDURE — 10004185 HB COUNTER-VISIT  CENSUS: Performed by: INTERNAL MEDICINE

## 2021-11-08 PROCEDURE — C1733 CATH, EP, OTHR THAN COOL-TIP: HCPCS | Performed by: INTERNAL MEDICINE

## 2021-11-08 RX ORDER — HEPARIN SODIUM 10000 [USP'U]/100ML
INJECTION, SOLUTION INTRAVENOUS CONTINUOUS PRN
Status: DISCONTINUED | OUTPATIENT
Start: 2021-11-08 | End: 2021-11-08

## 2021-11-08 RX ORDER — PROPOFOL 10 MG/ML
INJECTION, EMULSION INTRAVENOUS PRN
Status: DISCONTINUED | OUTPATIENT
Start: 2021-11-08 | End: 2021-11-08

## 2021-11-08 RX ORDER — FLECAINIDE ACETATE 100 MG/1
100 TABLET ORAL 2 TIMES DAILY
Status: DISCONTINUED | OUTPATIENT
Start: 2021-11-08 | End: 2021-11-09 | Stop reason: HOSPADM

## 2021-11-08 RX ORDER — HEPARIN SODIUM 200 [USP'U]/100ML
INJECTION, SOLUTION INTRAVENOUS PRN
Status: DISCONTINUED | OUTPATIENT
Start: 2021-11-08 | End: 2021-11-08 | Stop reason: HOSPADM

## 2021-11-08 RX ORDER — ACETAMINOPHEN 325 MG/1
650 TABLET ORAL EVERY 4 HOURS PRN
Status: DISCONTINUED | OUTPATIENT
Start: 2021-11-08 | End: 2021-11-09 | Stop reason: HOSPADM

## 2021-11-08 RX ORDER — DEXAMETHASONE SODIUM PHOSPHATE 4 MG/ML
INJECTION, SOLUTION INTRA-ARTICULAR; INTRALESIONAL; INTRAMUSCULAR; INTRAVENOUS; SOFT TISSUE PRN
Status: DISCONTINUED | OUTPATIENT
Start: 2021-11-08 | End: 2021-11-08

## 2021-11-08 RX ORDER — ACETAMINOPHEN 650 MG/1
650 SUPPOSITORY RECTAL EVERY 4 HOURS PRN
Status: DISCONTINUED | OUTPATIENT
Start: 2021-11-08 | End: 2021-11-09 | Stop reason: HOSPADM

## 2021-11-08 RX ORDER — LIDOCAINE HYDROCHLORIDE 20 MG/ML
INJECTION, SOLUTION EPIDURAL; INFILTRATION; INTRACAUDAL; PERINEURAL PRN
Status: DISCONTINUED | OUTPATIENT
Start: 2021-11-08 | End: 2021-11-08 | Stop reason: HOSPADM

## 2021-11-08 RX ORDER — COLCHICINE 0.6 MG/1
0.6 TABLET ORAL DAILY
Status: DISCONTINUED | OUTPATIENT
Start: 2021-11-08 | End: 2021-11-09 | Stop reason: HOSPADM

## 2021-11-08 RX ORDER — SUCRALFATE 1 G/1
1 TABLET ORAL
Status: DISCONTINUED | OUTPATIENT
Start: 2021-11-08 | End: 2021-11-09 | Stop reason: HOSPADM

## 2021-11-08 RX ORDER — ONDANSETRON 2 MG/ML
INJECTION INTRAMUSCULAR; INTRAVENOUS PRN
Status: DISCONTINUED | OUTPATIENT
Start: 2021-11-08 | End: 2021-11-08

## 2021-11-08 RX ORDER — LIDOCAINE HYDROCHLORIDE 20 MG/ML
INJECTION, SOLUTION INFILTRATION; PERINEURAL PRN
Status: DISCONTINUED | OUTPATIENT
Start: 2021-11-08 | End: 2021-11-08

## 2021-11-08 RX ORDER — ONDANSETRON 2 MG/ML
4 INJECTION INTRAMUSCULAR; INTRAVENOUS EVERY 12 HOURS PRN
Status: DISCONTINUED | OUTPATIENT
Start: 2021-11-08 | End: 2021-11-09 | Stop reason: HOSPADM

## 2021-11-08 RX ORDER — HEPARIN SODIUM 1000 [USP'U]/ML
INJECTION, SOLUTION INTRAVENOUS; SUBCUTANEOUS PRN
Status: DISCONTINUED | OUTPATIENT
Start: 2021-11-08 | End: 2021-11-08 | Stop reason: HOSPADM

## 2021-11-08 RX ORDER — SODIUM CHLORIDE, SODIUM LACTATE, POTASSIUM CHLORIDE, CALCIUM CHLORIDE 600; 310; 30; 20 MG/100ML; MG/100ML; MG/100ML; MG/100ML
INJECTION, SOLUTION INTRAVENOUS CONTINUOUS PRN
Status: DISCONTINUED | OUTPATIENT
Start: 2021-11-08 | End: 2021-11-08

## 2021-11-08 RX ORDER — MIDODRINE HYDROCHLORIDE 2.5 MG/1
2.5 TABLET ORAL 3 TIMES DAILY
Status: DISCONTINUED | OUTPATIENT
Start: 2021-11-08 | End: 2021-11-09

## 2021-11-08 RX ORDER — PANTOPRAZOLE SODIUM 40 MG/1
40 TABLET, DELAYED RELEASE ORAL 2 TIMES DAILY
Status: DISCONTINUED | OUTPATIENT
Start: 2021-11-08 | End: 2021-11-09 | Stop reason: HOSPADM

## 2021-11-08 RX ORDER — MIDAZOLAM HYDROCHLORIDE 1 MG/ML
INJECTION, SOLUTION INTRAMUSCULAR; INTRAVENOUS PRN
Status: DISCONTINUED | OUTPATIENT
Start: 2021-11-08 | End: 2021-11-08

## 2021-11-08 RX ORDER — PROCHLORPERAZINE EDISYLATE 5 MG/ML
5 INJECTION INTRAMUSCULAR; INTRAVENOUS EVERY 4 HOURS PRN
Status: DISCONTINUED | OUTPATIENT
Start: 2021-11-08 | End: 2021-11-09 | Stop reason: ALTCHOICE

## 2021-11-08 RX ORDER — ROCURONIUM BROMIDE 10 MG/ML
INJECTION, SOLUTION INTRAVENOUS PRN
Status: DISCONTINUED | OUTPATIENT
Start: 2021-11-08 | End: 2021-11-08

## 2021-11-08 RX ORDER — MAGNESIUM HYDROXIDE 1200 MG/15ML
LIQUID ORAL PRN
Status: DISCONTINUED | OUTPATIENT
Start: 2021-11-08 | End: 2021-11-08 | Stop reason: HOSPADM

## 2021-11-08 RX ORDER — ONDANSETRON 2 MG/ML
4 INJECTION INTRAMUSCULAR; INTRAVENOUS 2 TIMES DAILY PRN
Status: DISCONTINUED | OUTPATIENT
Start: 2021-11-08 | End: 2021-11-08 | Stop reason: SDUPTHER

## 2021-11-08 RX ORDER — 0.9 % SODIUM CHLORIDE 0.9 %
2 VIAL (ML) INJECTION EVERY 12 HOURS SCHEDULED
Status: DISCONTINUED | OUTPATIENT
Start: 2021-11-08 | End: 2021-11-09 | Stop reason: HOSPADM

## 2021-11-08 RX ORDER — CHOLESTYRAMINE LIGHT 4 G/5.7G
4 POWDER, FOR SUSPENSION ORAL 2 TIMES DAILY WITH MEALS
Status: DISCONTINUED | OUTPATIENT
Start: 2021-11-08 | End: 2021-11-09 | Stop reason: HOSPADM

## 2021-11-08 RX ORDER — RIZATRIPTAN BENZOATE 5 MG/1
5 TABLET, ORALLY DISINTEGRATING ORAL
Status: DISCONTINUED | OUTPATIENT
Start: 2021-11-08 | End: 2021-11-09 | Stop reason: HOSPADM

## 2021-11-08 RX ORDER — HYDROCODONE BITARTRATE AND ACETAMINOPHEN 5; 325 MG/1; MG/1
1 TABLET ORAL EVERY 4 HOURS PRN
Status: DISCONTINUED | OUTPATIENT
Start: 2021-11-08 | End: 2021-11-09 | Stop reason: HOSPADM

## 2021-11-08 RX ORDER — ONDANSETRON 4 MG/1
4 TABLET, ORALLY DISINTEGRATING ORAL EVERY 12 HOURS PRN
Status: DISCONTINUED | OUTPATIENT
Start: 2021-11-08 | End: 2021-11-09 | Stop reason: HOSPADM

## 2021-11-08 RX ORDER — PROTAMINE SULFATE 10 MG/ML
INJECTION, SOLUTION INTRAVENOUS PRN
Status: DISCONTINUED | OUTPATIENT
Start: 2021-11-08 | End: 2021-11-08 | Stop reason: HOSPADM

## 2021-11-08 RX ADMIN — METOPROLOL TARTRATE 12.5 MG: 25 TABLET, FILM COATED ORAL at 20:53

## 2021-11-08 RX ADMIN — PROCHLORPERAZINE EDISYLATE 5 MG: 5 INJECTION INTRAMUSCULAR; INTRAVENOUS at 16:36

## 2021-11-08 RX ADMIN — MIDAZOLAM HYDROCHLORIDE 2 MG: 1 INJECTION, SOLUTION INTRAMUSCULAR; INTRAVENOUS at 12:48

## 2021-11-08 RX ADMIN — PROPOFOL 200 MG: 10 INJECTION, EMULSION INTRAVENOUS at 12:49

## 2021-11-08 RX ADMIN — SUCRALFATE 1 G: 1 TABLET ORAL at 20:52

## 2021-11-08 RX ADMIN — SODIUM CHLORIDE, PRESERVATIVE FREE 2 ML: 5 INJECTION INTRAVENOUS at 20:57

## 2021-11-08 RX ADMIN — SUGAMMADEX 200 MG: 100 INJECTION, SOLUTION INTRAVENOUS at 15:35

## 2021-11-08 RX ADMIN — PANTOPRAZOLE SODIUM 40 MG: 40 TABLET, DELAYED RELEASE ORAL at 20:53

## 2021-11-08 RX ADMIN — SODIUM CHLORIDE, POTASSIUM CHLORIDE, SODIUM LACTATE AND CALCIUM CHLORIDE: 600; 310; 30; 20 INJECTION, SOLUTION INTRAVENOUS at 12:38

## 2021-11-08 RX ADMIN — DEXAMETHASONE SODIUM PHOSPHATE 10 MG: 4 INJECTION, SOLUTION INTRAMUSCULAR; INTRAVENOUS at 12:51

## 2021-11-08 RX ADMIN — ONDANSETRON 4 MG: 2 INJECTION INTRAMUSCULAR; INTRAVENOUS at 15:35

## 2021-11-08 RX ADMIN — IVABRADINE 5 MG: 5 TABLET, FILM COATED ORAL at 22:03

## 2021-11-08 RX ADMIN — ROCURONIUM BROMIDE 40 MG: 50 INJECTION, SOLUTION INTRAVENOUS at 12:49

## 2021-11-08 RX ADMIN — FLECAINIDE ACETATE 100 MG: 100 TABLET ORAL at 22:03

## 2021-11-08 RX ADMIN — LIDOCAINE HYDROCHLORIDE 3 ML: 20 INJECTION, SOLUTION INFILTRATION; PERINEURAL at 12:48

## 2021-11-08 RX ADMIN — COLCHICINE 0.6 MG: 0.6 TABLET, FILM COATED ORAL at 20:54

## 2021-11-08 RX ADMIN — FENTANYL CITRATE 100 MCG: 50 INJECTION INTRAMUSCULAR; INTRAVENOUS at 13:13

## 2021-11-08 RX ADMIN — APIXABAN 5 MG: 5 TABLET, FILM COATED ORAL at 20:52

## 2021-11-08 ASSESSMENT — PAIN SCALES - GENERAL
PAINLEVEL_OUTOF10: 3
PAINLEVEL_OUTOF10: 0
PAINLEVEL_OUTOF10: 2

## 2021-11-08 ASSESSMENT — COGNITIVE AND FUNCTIONAL STATUS - GENERAL
ARE YOU DEAF OR DO YOU HAVE SERIOUS DIFFICULTY  HEARING: NO
DO YOU HAVE DIFFICULTY DRESSING OR BATHING: NO
ARE YOU BLIND OR DO YOU HAVE SERIOUS DIFFICULTY SEEING, EVEN WHEN WEARING GLASSES: NO
BECAUSE OF A PHYSICAL, MENTAL, OR EMOTIONAL CONDITION, DO YOU HAVE SERIOUS DIFFICULTY CONCENTRATING, REMEMBERING OR MAKING DECISIONS: NO
DO YOU HAVE SERIOUS DIFFICULTY WALKING OR CLIMBING STAIRS: NO
BECAUSE OF A PHYSICAL, MENTAL, OR EMOTIONAL CONDITION, DO YOU HAVE DIFFICULTY DOING ERRANDS ALONE: NO

## 2021-11-08 ASSESSMENT — ACTIVITIES OF DAILY LIVING (ADL)
DESCRIBE HOW PAIN IMPACTS YOUR LIFE: NONE/CAN MANAGE
CHRONIC_PAIN_PRESENT: YES, CHRONIC

## 2021-11-08 ASSESSMENT — ENCOUNTER SYMPTOMS: HEADACHES: 1

## 2021-11-09 VITALS
OXYGEN SATURATION: 98 % | RESPIRATION RATE: 17 BRPM | WEIGHT: 143.96 LBS | SYSTOLIC BLOOD PRESSURE: 116 MMHG | BODY MASS INDEX: 25.51 KG/M2 | TEMPERATURE: 97.8 F | DIASTOLIC BLOOD PRESSURE: 76 MMHG | HEIGHT: 63 IN | HEART RATE: 90 BPM

## 2021-11-09 PROCEDURE — 10002767 HB EXTENDED RECOVERY PER HOUR

## 2021-11-09 PROCEDURE — 10002803 HB RX 637: Performed by: INTERNAL MEDICINE

## 2021-11-09 PROCEDURE — 99214 OFFICE O/P EST MOD 30 MIN: CPT | Performed by: INTERNAL MEDICINE

## 2021-11-09 PROCEDURE — 10004651 HB RX, NO CHARGE ITEM: Performed by: INTERNAL MEDICINE

## 2021-11-09 RX ORDER — COLCHICINE 0.6 MG/1
0.6 TABLET ORAL DAILY
Qty: 14 TABLET | Refills: 0 | Status: SHIPPED | OUTPATIENT
Start: 2021-11-09

## 2021-11-09 RX ORDER — SUCRALFATE 1 G/1
1 TABLET ORAL
Qty: 120 TABLET | Refills: 0 | Status: SHIPPED | OUTPATIENT
Start: 2021-11-09

## 2021-11-09 RX ORDER — MIDODRINE HYDROCHLORIDE 5 MG/1
5 TABLET ORAL 3 TIMES DAILY
Status: SHIPPED | COMMUNITY
Start: 2021-11-09

## 2021-11-09 RX ORDER — MIDODRINE HYDROCHLORIDE 5 MG/1
5 TABLET ORAL 3 TIMES DAILY
Status: DISCONTINUED | OUTPATIENT
Start: 2021-11-09 | End: 2021-11-09 | Stop reason: HOSPADM

## 2021-11-09 RX ADMIN — APIXABAN 5 MG: 5 TABLET, FILM COATED ORAL at 09:58

## 2021-11-09 RX ADMIN — SUCRALFATE 1 G: 1 TABLET ORAL at 06:22

## 2021-11-09 RX ADMIN — CARBIDOPA AND LEVODOPA 2.5 MG: 50; 200 TABLET, EXTENDED RELEASE ORAL at 09:57

## 2021-11-09 RX ADMIN — CARBIDOPA AND LEVODOPA 2.5 MG: 50; 200 TABLET, EXTENDED RELEASE ORAL at 13:14

## 2021-11-09 RX ADMIN — SODIUM CHLORIDE, PRESERVATIVE FREE 2 ML: 5 INJECTION INTRAVENOUS at 09:00

## 2021-11-09 RX ADMIN — IVABRADINE 5 MG: 5 TABLET, FILM COATED ORAL at 10:06

## 2021-11-09 RX ADMIN — RIZATRIPTAN BENZOATE 5 MG: 5 TABLET, ORALLY DISINTEGRATING ORAL at 06:48

## 2021-11-09 RX ADMIN — COLCHICINE 0.6 MG: 0.6 TABLET, FILM COATED ORAL at 09:57

## 2021-11-09 RX ADMIN — SUCRALFATE 1 G: 1 TABLET ORAL at 12:39

## 2021-11-09 RX ADMIN — METOPROLOL TARTRATE 25 MG: 25 TABLET, FILM COATED ORAL at 09:58

## 2021-11-09 RX ADMIN — FLECAINIDE ACETATE 100 MG: 100 TABLET ORAL at 10:06

## 2021-11-09 RX ADMIN — PANTOPRAZOLE SODIUM 40 MG: 40 TABLET, DELAYED RELEASE ORAL at 09:57

## 2021-11-09 ASSESSMENT — PAIN SCALES - GENERAL
PAINLEVEL_OUTOF10: 0
PAINLEVEL_OUTOF10: 3
PAINLEVEL_OUTOF10: 0

## 2021-11-10 ENCOUNTER — TELEPHONE (OUTPATIENT)
Dept: ELECTROPHYSIOLOGY | Age: 26
End: 2021-11-10

## 2021-12-06 PROBLEM — Z79.01 CHRONIC ANTICOAGULATION: Status: ACTIVE | Noted: 2021-12-06

## 2021-12-07 ENCOUNTER — OFFICE VISIT (OUTPATIENT)
Dept: ELECTROPHYSIOLOGY | Age: 26
End: 2021-12-07
Attending: INTERNAL MEDICINE

## 2021-12-07 VITALS
BODY MASS INDEX: 23.9 KG/M2 | HEIGHT: 64 IN | WEIGHT: 140 LBS | HEART RATE: 85 BPM | SYSTOLIC BLOOD PRESSURE: 110 MMHG | DIASTOLIC BLOOD PRESSURE: 74 MMHG

## 2021-12-07 DIAGNOSIS — Z79.899 HIGH RISK MEDICATION USE: ICD-10-CM

## 2021-12-07 DIAGNOSIS — G90.A POTS (POSTURAL ORTHOSTATIC TACHYCARDIA SYNDROME): ICD-10-CM

## 2021-12-07 DIAGNOSIS — R55 SYNCOPE: ICD-10-CM

## 2021-12-07 DIAGNOSIS — I48.0 PAF (PAROXYSMAL ATRIAL FIBRILLATION) (CMD): Primary | ICD-10-CM

## 2021-12-07 DIAGNOSIS — R00.0 WIDE-COMPLEX TACHYCARDIA: ICD-10-CM

## 2021-12-07 PROCEDURE — 99214 OFFICE O/P EST MOD 30 MIN: CPT | Performed by: INTERNAL MEDICINE

## 2021-12-07 PROCEDURE — 93041 RHYTHM ECG TRACING: CPT | Performed by: INTERNAL MEDICINE

## 2021-12-07 PROCEDURE — 99212 OFFICE O/P EST SF 10 MIN: CPT

## 2021-12-07 ASSESSMENT — ENCOUNTER SYMPTOMS
BLOOD IN STOOL: 0
COUGH: 0
APPETITE CHANGE: 0
FEVER: 0
NAUSEA: 0
HEADACHES: 1
VOMITING: 0
CHILLS: 0
DIZZINESS: 0
DIARRHEA: 0
CONSTIPATION: 0
SHORTNESS OF BREATH: 1
FATIGUE: 1

## 2021-12-22 DIAGNOSIS — R00.2 PALPITATIONS: ICD-10-CM

## 2021-12-30 ENCOUNTER — LAB SERVICES (OUTPATIENT)
Dept: LAB | Age: 26
End: 2021-12-30

## 2021-12-30 DIAGNOSIS — I48.0 AF (PAROXYSMAL ATRIAL FIBRILLATION) (CMD): ICD-10-CM

## 2021-12-30 LAB
ALBUMIN SERPL-MCNC: 4 G/DL (ref 3.6–5.1)
ALBUMIN/GLOB SERPL: 1.2 {RATIO} (ref 1–2.4)
ALP SERPL-CCNC: 63 UNITS/L (ref 45–117)
ALT SERPL-CCNC: 54 UNITS/L
ANION GAP SERPL CALC-SCNC: 14 MMOL/L (ref 10–20)
AST SERPL-CCNC: 32 UNITS/L
BILIRUB SERPL-MCNC: 0.5 MG/DL (ref 0.2–1)
BUN SERPL-MCNC: 13 MG/DL (ref 6–20)
BUN/CREAT SERPL: 15 (ref 7–25)
CALCIUM SERPL-MCNC: 8.6 MG/DL (ref 8.4–10.2)
CHLORIDE SERPL-SCNC: 100 MMOL/L (ref 98–107)
CO2 SERPL-SCNC: 31 MMOL/L (ref 21–32)
CREAT SERPL-MCNC: 0.86 MG/DL (ref 0.67–1.17)
DEPRECATED RDW RBC: 35.6 FL (ref 39–50)
ERYTHROCYTE [DISTWIDTH] IN BLOOD: 11.7 % (ref 11–15)
FASTING DURATION TIME PATIENT: 0 HOURS (ref 0–999)
GFR SERPLBLD BASED ON 1.73 SQ M-ARVRAT: >90 ML/MIN
GLOBULIN SER-MCNC: 3.3 G/DL (ref 2–4)
GLUCOSE SERPL-MCNC: 89 MG/DL (ref 70–99)
HCT VFR BLD CALC: 44.6 % (ref 39–51)
HGB BLD-MCNC: 15.7 G/DL (ref 13–17)
MAGNESIUM SERPL-MCNC: 1.8 MG/DL (ref 1.7–2.4)
MCH RBC QN AUTO: 29.8 PG (ref 26–34)
MCHC RBC AUTO-ENTMCNC: 35.2 G/DL (ref 32–36.5)
MCV RBC AUTO: 84.6 FL (ref 78–100)
PLATELET # BLD AUTO: 281 K/MCL (ref 140–450)
POTASSIUM SERPL-SCNC: 4.1 MMOL/L (ref 3.4–5.1)
PROT SERPL-MCNC: 7.3 G/DL (ref 6.4–8.2)
RBC # BLD: 5.27 MIL/MCL (ref 4.5–5.9)
SODIUM SERPL-SCNC: 141 MMOL/L (ref 135–145)
WBC # BLD: 5.3 K/MCL (ref 4.2–11)

## 2021-12-30 PROCEDURE — 85027 COMPLETE CBC AUTOMATED: CPT | Performed by: INTERNAL MEDICINE

## 2021-12-30 PROCEDURE — 80053 COMPREHEN METABOLIC PANEL: CPT | Performed by: INTERNAL MEDICINE

## 2021-12-30 PROCEDURE — 83735 ASSAY OF MAGNESIUM: CPT | Performed by: INTERNAL MEDICINE

## 2021-12-30 PROCEDURE — 36415 COLL VENOUS BLD VENIPUNCTURE: CPT | Performed by: INTERNAL MEDICINE

## 2022-02-28 ENCOUNTER — TRANSFERRED RECORDS (OUTPATIENT)
Dept: HEALTH INFORMATION MANAGEMENT | Facility: CLINIC | Age: 27
End: 2022-02-28

## 2022-03-08 ENCOUNTER — APPOINTMENT (OUTPATIENT)
Dept: ELECTROPHYSIOLOGY | Age: 27
End: 2022-03-08
Attending: INTERNAL MEDICINE

## 2022-03-21 ENCOUNTER — EXTERNAL RECORD (OUTPATIENT)
Dept: OTHER | Age: 27
End: 2022-03-21

## 2022-04-05 ENCOUNTER — TELEPHONE (OUTPATIENT)
Dept: GASTROENTEROLOGY | Age: 27
End: 2022-04-05

## 2022-04-12 ENCOUNTER — OFFICE VISIT (OUTPATIENT)
Dept: ELECTROPHYSIOLOGY | Age: 27
End: 2022-04-12
Attending: INTERNAL MEDICINE

## 2022-04-12 VITALS
HEART RATE: 94 BPM | WEIGHT: 150 LBS | BODY MASS INDEX: 25.61 KG/M2 | SYSTOLIC BLOOD PRESSURE: 104 MMHG | HEIGHT: 64 IN | DIASTOLIC BLOOD PRESSURE: 82 MMHG

## 2022-04-12 DIAGNOSIS — I48.0 PAF (PAROXYSMAL ATRIAL FIBRILLATION) (CMD): Primary | ICD-10-CM

## 2022-04-12 DIAGNOSIS — R00.0 WIDE-COMPLEX TACHYCARDIA: ICD-10-CM

## 2022-04-12 DIAGNOSIS — Z79.01 ANTICOAGULATION ADEQUATE: ICD-10-CM

## 2022-04-12 DIAGNOSIS — G90.A POTS (POSTURAL ORTHOSTATIC TACHYCARDIA SYNDROME): ICD-10-CM

## 2022-04-12 DIAGNOSIS — Z79.899 HIGH RISK MEDICATION USE: ICD-10-CM

## 2022-04-12 PROCEDURE — 99212 OFFICE O/P EST SF 10 MIN: CPT

## 2022-04-12 PROCEDURE — 93041 RHYTHM ECG TRACING: CPT | Performed by: INTERNAL MEDICINE

## 2022-04-12 PROCEDURE — 99214 OFFICE O/P EST MOD 30 MIN: CPT | Performed by: INTERNAL MEDICINE

## 2022-04-12 RX ORDER — FLECAINIDE ACETATE 100 MG/1
100 TABLET ORAL 2 TIMES DAILY PRN
Qty: 30 TABLET | Refills: 0 | Status: SHIPPED | COMMUNITY
Start: 2022-04-12

## 2022-04-12 ASSESSMENT — ENCOUNTER SYMPTOMS
DIZZINESS: 0
FEVER: 0
SPEECH DIFFICULTY: 0
SHORTNESS OF BREATH: 1
SINUS PAIN: 0
BLOOD IN STOOL: 0
SINUS PRESSURE: 1
FACIAL ASYMMETRY: 0
WOUND: 0
NUMBNESS: 0
WHEEZING: 0
LIGHT-HEADEDNESS: 0
BRUISES/BLEEDS EASILY: 0
CONFUSION: 0
FATIGUE: 1
WEAKNESS: 0
CHILLS: 0
COUGH: 1

## 2022-05-05 ENCOUNTER — OFFICE VISIT (OUTPATIENT)
Dept: GASTROENTEROLOGY | Age: 27
End: 2022-05-05

## 2022-05-05 VITALS
SYSTOLIC BLOOD PRESSURE: 112 MMHG | BODY MASS INDEX: 26.51 KG/M2 | WEIGHT: 149.6 LBS | HEIGHT: 63 IN | DIASTOLIC BLOOD PRESSURE: 70 MMHG | TEMPERATURE: 97.9 F

## 2022-05-05 DIAGNOSIS — R13.19 OTHER DYSPHAGIA: Primary | ICD-10-CM

## 2022-05-05 PROCEDURE — 99204 OFFICE O/P NEW MOD 45 MIN: CPT | Performed by: INTERNAL MEDICINE

## 2022-05-09 ENCOUNTER — TELEPHONE (OUTPATIENT)
Dept: GASTROENTEROLOGY | Age: 27
End: 2022-05-09

## 2022-05-09 DIAGNOSIS — Z01.812 ENCOUNTER FOR PREPROCEDURE SCREENING LABORATORY TESTING FOR COVID-19: Primary | ICD-10-CM

## 2022-05-09 DIAGNOSIS — Z11.52 ENCOUNTER FOR PREPROCEDURE SCREENING LABORATORY TESTING FOR COVID-19: Primary | ICD-10-CM

## 2022-05-10 ENCOUNTER — PREP FOR CASE (OUTPATIENT)
Dept: GASTROENTEROLOGY | Age: 27
End: 2022-05-10

## 2022-05-10 DIAGNOSIS — R13.10 ODYNOPHAGIA: Primary | ICD-10-CM

## 2022-06-11 ENCOUNTER — LAB SERVICES (OUTPATIENT)
Dept: LAB | Age: 27
End: 2022-06-11
Attending: INTERNAL MEDICINE

## 2022-06-11 DIAGNOSIS — Z11.52 ENCOUNTER FOR PREPROCEDURE SCREENING LABORATORY TESTING FOR COVID-19: ICD-10-CM

## 2022-06-11 DIAGNOSIS — Z01.812 ENCOUNTER FOR PREPROCEDURE SCREENING LABORATORY TESTING FOR COVID-19: ICD-10-CM

## 2022-06-11 LAB
SARS-COV-2 RNA RESP QL NAA+PROBE: NOT DETECTED
SERVICE CMNT-IMP: NORMAL
SERVICE CMNT-IMP: NORMAL

## 2022-06-11 PROCEDURE — U0005 INFEC AGEN DETEC AMPLI PROBE: HCPCS

## 2022-06-13 ENCOUNTER — ANESTHESIA EVENT (OUTPATIENT)
Dept: SURGERY | Age: 27
End: 2022-06-13

## 2022-06-13 RX ORDER — GUAIFENESIN 600 MG/1
1200 TABLET, EXTENDED RELEASE ORAL 2 TIMES DAILY PRN
COMMUNITY

## 2022-06-13 RX ORDER — EPTINEZUMAB-JJMR 100 MG/ML
100 INJECTION INTRAVENOUS
COMMUNITY
Start: 2022-01-21

## 2022-06-13 RX ORDER — NARATRIPTAN 2.5 MG/1
2.5 TABLET ORAL DAILY PRN
COMMUNITY
Start: 2021-12-10 | End: 2022-12-10

## 2022-06-13 RX ORDER — NORTRIPTYLINE HYDROCHLORIDE 10 MG/1
20 CAPSULE ORAL AT BEDTIME
COMMUNITY
Start: 2021-12-10

## 2022-06-13 RX ORDER — DIPHENHYDRAMINE HCL 25 MG
1 CAPSULE ORAL EVERY 4 HOURS PRN
COMMUNITY

## 2022-06-13 RX ORDER — POLYETHYLENE GLYCOL 3350 17 G/17G
17 POWDER, FOR SOLUTION ORAL DAILY PRN
COMMUNITY

## 2022-06-13 ASSESSMENT — ACTIVITIES OF DAILY LIVING (ADL)
ADL_SHORT_OF_BREATH: NO
SENSORY_SUPPORT_DEVICES: EYEGLASSES
ADL_SCORE: 12
ADL_BEFORE_ADMISSION: INDEPENDENT
NEEDS_ASSIST: NO
HISTORY OF FALLING IN THE LAST YEAR (PRIOR TO ADMISSION): NO
RECENT_DECLINE_ADL: NO

## 2022-06-13 ASSESSMENT — ENCOUNTER SYMPTOMS: HEADACHES: 1

## 2022-06-13 ASSESSMENT — COGNITIVE AND FUNCTIONAL STATUS - GENERAL
ARE YOU DEAF OR DO YOU HAVE SERIOUS DIFFICULTY  HEARING: NO
ARE YOU BLIND OR DO YOU HAVE SERIOUS DIFFICULTY SEEING, EVEN WHEN WEARING GLASSES: NO

## 2022-06-14 ENCOUNTER — HOSPITAL ENCOUNTER (OUTPATIENT)
Age: 27
Discharge: HOME | End: 2022-06-14
Attending: INTERNAL MEDICINE | Admitting: INTERNAL MEDICINE

## 2022-06-14 ENCOUNTER — ANESTHESIA (OUTPATIENT)
Dept: SURGERY | Age: 27
End: 2022-06-14

## 2022-06-14 VITALS
WEIGHT: 149.91 LBS | OXYGEN SATURATION: 97 % | SYSTOLIC BLOOD PRESSURE: 115 MMHG | DIASTOLIC BLOOD PRESSURE: 77 MMHG | RESPIRATION RATE: 20 BRPM | HEIGHT: 63 IN | TEMPERATURE: 97.7 F | BODY MASS INDEX: 26.56 KG/M2 | HEART RATE: 67 BPM

## 2022-06-14 DIAGNOSIS — R13.10 ODYNOPHAGIA: ICD-10-CM

## 2022-06-14 PROCEDURE — 88305 TISSUE EXAM BY PATHOLOGIST: CPT | Performed by: PATHOLOGY

## 2022-06-14 PROCEDURE — 43239 EGD BIOPSY SINGLE/MULTIPLE: CPT | Performed by: INTERNAL MEDICINE

## 2022-06-14 PROCEDURE — 43239 EGD BIOPSY SINGLE/MULTIPLE: CPT | Performed by: CLINIC/CENTER

## 2022-06-14 PROCEDURE — A43239 ANES UGI ENDO; W/BX 1/MX: Performed by: ANESTHESIOLOGIST ASSISTANT

## 2022-06-14 PROCEDURE — A43239 ANES UGI ENDO; W/BX 1/MX: Performed by: ANESTHESIOLOGY

## 2022-06-14 RX ORDER — 0.9 % SODIUM CHLORIDE 0.9 %
2 VIAL (ML) INJECTION EVERY 12 HOURS SCHEDULED
Status: DISCONTINUED | OUTPATIENT
Start: 2022-06-14 | End: 2022-06-16 | Stop reason: HOSPADM

## 2022-06-14 RX ORDER — SODIUM CHLORIDE, SODIUM LACTATE, POTASSIUM CHLORIDE, CALCIUM CHLORIDE 600; 310; 30; 20 MG/100ML; MG/100ML; MG/100ML; MG/100ML
INJECTION, SOLUTION INTRAVENOUS CONTINUOUS
Status: DISCONTINUED | OUTPATIENT
Start: 2022-06-14 | End: 2022-06-16 | Stop reason: HOSPADM

## 2022-06-14 RX ORDER — SODIUM CHLORIDE 9 MG/ML
INJECTION, SOLUTION INTRAVENOUS CONTINUOUS
Status: DISCONTINUED | OUTPATIENT
Start: 2022-06-14 | End: 2022-06-16 | Stop reason: HOSPADM

## 2022-06-14 RX ORDER — LIDOCAINE HYDROCHLORIDE 10 MG/ML
5-10 INJECTION, SOLUTION INFILTRATION; PERINEURAL PRN
Status: DISCONTINUED | OUTPATIENT
Start: 2022-06-14 | End: 2022-06-16 | Stop reason: HOSPADM

## 2022-06-14 RX ORDER — NICOTINE POLACRILEX 4 MG
30 LOZENGE BUCCAL
Status: DISCONTINUED | OUTPATIENT
Start: 2022-06-14 | End: 2022-06-16 | Stop reason: HOSPADM

## 2022-06-14 RX ORDER — SODIUM CHLORIDE, SODIUM LACTATE, POTASSIUM CHLORIDE, CALCIUM CHLORIDE 600; 310; 30; 20 MG/100ML; MG/100ML; MG/100ML; MG/100ML
INJECTION, SOLUTION INTRAVENOUS CONTINUOUS PRN
Status: DISCONTINUED | OUTPATIENT
Start: 2022-06-14 | End: 2022-06-14

## 2022-06-14 RX ORDER — ONDANSETRON 2 MG/ML
4 INJECTION INTRAMUSCULAR; INTRAVENOUS 2 TIMES DAILY PRN
Status: DISCONTINUED | OUTPATIENT
Start: 2022-06-14 | End: 2022-06-16 | Stop reason: HOSPADM

## 2022-06-14 RX ORDER — DEXTROSE MONOHYDRATE 50 MG/ML
INJECTION, SOLUTION INTRAVENOUS CONTINUOUS PRN
Status: DISCONTINUED | OUTPATIENT
Start: 2022-06-14 | End: 2022-06-16 | Stop reason: HOSPADM

## 2022-06-14 RX ORDER — PROPOFOL 10 MG/ML
INJECTION, EMULSION INTRAVENOUS PRN
Status: DISCONTINUED | OUTPATIENT
Start: 2022-06-14 | End: 2022-06-14

## 2022-06-14 RX ORDER — DIPHENHYDRAMINE HYDROCHLORIDE 50 MG/ML
25 INJECTION INTRAMUSCULAR; INTRAVENOUS
Status: DISCONTINUED | OUTPATIENT
Start: 2022-06-14 | End: 2022-06-16 | Stop reason: HOSPADM

## 2022-06-14 RX ORDER — HUMAN INSULIN 100 [IU]/ML
INJECTION, SOLUTION SUBCUTANEOUS
Status: DISCONTINUED | OUTPATIENT
Start: 2022-06-14 | End: 2022-06-16 | Stop reason: HOSPADM

## 2022-06-14 RX ORDER — LIDOCAINE HYDROCHLORIDE 20 MG/ML
10 SOLUTION OROPHARYNGEAL
Status: DISCONTINUED | OUTPATIENT
Start: 2022-06-14 | End: 2022-06-16 | Stop reason: HOSPADM

## 2022-06-14 RX ORDER — LIDOCAINE HYDROCHLORIDE 20 MG/ML
INJECTION, SOLUTION INFILTRATION; PERINEURAL PRN
Status: DISCONTINUED | OUTPATIENT
Start: 2022-06-14 | End: 2022-06-14

## 2022-06-14 RX ORDER — DEXTROSE MONOHYDRATE 25 G/50ML
25 INJECTION, SOLUTION INTRAVENOUS PRN
Status: DISCONTINUED | OUTPATIENT
Start: 2022-06-14 | End: 2022-06-16 | Stop reason: HOSPADM

## 2022-06-14 RX ADMIN — PROPOFOL 40 MG: 10 INJECTION, EMULSION INTRAVENOUS at 11:30

## 2022-06-14 RX ADMIN — LIDOCAINE HYDROCHLORIDE 50 MG: 20 INJECTION, SOLUTION INFILTRATION; PERINEURAL at 11:29

## 2022-06-14 RX ADMIN — PROPOFOL 50 MG: 10 INJECTION, EMULSION INTRAVENOUS at 11:29

## 2022-06-14 RX ADMIN — ONDANSETRON 4 MG: 2 INJECTION INTRAMUSCULAR; INTRAVENOUS at 11:54

## 2022-06-14 RX ADMIN — SODIUM CHLORIDE, SODIUM LACTATE, POTASSIUM CHLORIDE, CALCIUM CHLORIDE: 600; 310; 30; 20 INJECTION, SOLUTION INTRAVENOUS at 11:25

## 2022-06-14 SDOH — SOCIAL STABILITY: SOCIAL INSECURITY: RISK FACTORS: HEART DISEASE

## 2022-06-14 ASSESSMENT — PAIN SCALES - GENERAL
PAINLEVEL_OUTOF10: 0

## 2022-06-15 ENCOUNTER — TELEPHONE (OUTPATIENT)
Dept: GASTROENTEROLOGY | Age: 27
End: 2022-06-15

## 2022-06-15 RX ORDER — LIDOCAINE HYDROCHLORIDE 20 MG/ML
15 SOLUTION OROPHARYNGEAL EVERY 4 HOURS PRN
Qty: 1200 ML | Refills: 0 | Status: SHIPPED | OUTPATIENT
Start: 2022-06-15

## 2022-06-16 LAB
ASR DISCLAIMER: NORMAL
CASE RPRT: NORMAL
CLINICAL INFO: NORMAL
PATH REPORT.FINAL DX SPEC: NORMAL
PATH REPORT.GROSS SPEC: NORMAL
PATH REPORT.MICROSCOPIC SPEC OTHER STN: NORMAL

## 2022-06-27 ENCOUNTER — HOSPITAL ENCOUNTER (OUTPATIENT)
Dept: GENERAL RADIOLOGY | Age: 27
Discharge: HOME | End: 2022-06-27
Attending: INTERNAL MEDICINE

## 2022-06-27 DIAGNOSIS — R13.19 OTHER DYSPHAGIA: ICD-10-CM

## 2022-06-27 PROCEDURE — 74221 X-RAY XM ESOPHAGUS 2CNTRST: CPT

## 2022-06-27 PROCEDURE — 74220 X-RAY XM ESOPHAGUS 1CNTRST: CPT | Performed by: RADIOLOGY

## 2022-06-28 ENCOUNTER — TELEPHONE (OUTPATIENT)
Dept: GASTROENTEROLOGY | Age: 27
End: 2022-06-28

## 2022-06-29 ENCOUNTER — TELEPHONE (OUTPATIENT)
Dept: ELECTROPHYSIOLOGY | Age: 27
End: 2022-06-29

## 2022-07-16 ENCOUNTER — HOSPITAL ENCOUNTER (EMERGENCY)
Age: 27
Discharge: HOME | End: 2022-07-16
Attending: EMERGENCY MEDICINE

## 2022-07-16 VITALS
HEART RATE: 88 BPM | TEMPERATURE: 99.5 F | SYSTOLIC BLOOD PRESSURE: 123 MMHG | RESPIRATION RATE: 18 BRPM | BODY MASS INDEX: 27.77 KG/M2 | WEIGHT: 156.75 LBS | DIASTOLIC BLOOD PRESSURE: 75 MMHG | OXYGEN SATURATION: 98 %

## 2022-07-16 DIAGNOSIS — R11.2 NAUSEA VOMITING AND DIARRHEA: Primary | ICD-10-CM

## 2022-07-16 DIAGNOSIS — R19.7 NAUSEA VOMITING AND DIARRHEA: Primary | ICD-10-CM

## 2022-07-16 LAB
ALBUMIN SERPL-MCNC: 4.5 G/DL (ref 3.6–5.1)
ALBUMIN/GLOB SERPL: 1.3 {RATIO} (ref 1–2.4)
ALP SERPL-CCNC: 66 UNITS/L (ref 45–117)
ALT SERPL-CCNC: 98 UNITS/L
ANION GAP SERPL CALC-SCNC: 10 MMOL/L (ref 7–19)
AST SERPL-CCNC: 42 UNITS/L
BASOPHILS # BLD: 0.1 K/MCL (ref 0–0.3)
BASOPHILS NFR BLD: 1 %
BILIRUB SERPL-MCNC: 0.7 MG/DL (ref 0.2–1)
BUN SERPL-MCNC: 17 MG/DL (ref 6–20)
BUN/CREAT SERPL: 23 (ref 7–25)
CALCIUM SERPL-MCNC: 9.4 MG/DL (ref 8.4–10.2)
CHLORIDE SERPL-SCNC: 102 MMOL/L (ref 97–110)
CO2 SERPL-SCNC: 31 MMOL/L (ref 21–32)
CREAT SERPL-MCNC: 0.73 MG/DL (ref 0.67–1.17)
DEPRECATED RDW RBC: 36.6 FL (ref 39–50)
EOSINOPHIL # BLD: 0.1 K/MCL (ref 0–0.5)
EOSINOPHIL NFR BLD: 1 %
ERYTHROCYTE [DISTWIDTH] IN BLOOD: 11.9 % (ref 11–15)
FASTING DURATION TIME PATIENT: ABNORMAL H
GFR SERPLBLD BASED ON 1.73 SQ M-ARVRAT: >90 ML/MIN
GLOBULIN SER-MCNC: 3.4 G/DL (ref 2–4)
GLUCOSE SERPL-MCNC: 110 MG/DL (ref 70–99)
HCT VFR BLD CALC: 47.2 % (ref 39–51)
HGB BLD-MCNC: 16.4 G/DL (ref 13–17)
IMM GRANULOCYTES # BLD AUTO: 0 K/MCL (ref 0–0.2)
IMM GRANULOCYTES # BLD: 0 %
LIPASE SERPL-CCNC: <50 UNITS/L (ref 73–393)
LYMPHOCYTES # BLD: 1.5 K/MCL (ref 1–4.8)
LYMPHOCYTES NFR BLD: 17 %
MCH RBC QN AUTO: 29.4 PG (ref 26–34)
MCHC RBC AUTO-ENTMCNC: 34.7 G/DL (ref 32–36.5)
MCV RBC AUTO: 84.7 FL (ref 78–100)
MONOCYTES # BLD: 0.5 K/MCL (ref 0.3–0.9)
MONOCYTES NFR BLD: 6 %
NEUTROPHILS # BLD: 6.6 K/MCL (ref 1.8–7.7)
NEUTROPHILS NFR BLD: 75 %
NRBC BLD MANUAL-RTO: 0 /100 WBC
PLATELET # BLD AUTO: 329 K/MCL (ref 140–450)
POTASSIUM SERPL-SCNC: 4.2 MMOL/L (ref 3.4–5.1)
PROT SERPL-MCNC: 7.9 G/DL (ref 6.4–8.2)
RAINBOW EXTRA TUBES HOLD SPECIMEN: NORMAL
RBC # BLD: 5.57 MIL/MCL (ref 4.5–5.9)
SODIUM SERPL-SCNC: 139 MMOL/L (ref 135–145)
WBC # BLD: 8.7 K/MCL (ref 4.2–11)

## 2022-07-16 PROCEDURE — 93005 ELECTROCARDIOGRAM TRACING: CPT | Performed by: EMERGENCY MEDICINE

## 2022-07-16 PROCEDURE — 85025 COMPLETE CBC W/AUTO DIFF WBC: CPT | Performed by: EMERGENCY MEDICINE

## 2022-07-16 PROCEDURE — 80053 COMPREHEN METABOLIC PANEL: CPT | Performed by: EMERGENCY MEDICINE

## 2022-07-16 PROCEDURE — 10002807 HB RX 258: Performed by: EMERGENCY MEDICINE

## 2022-07-16 PROCEDURE — 99284 EMERGENCY DEPT VISIT MOD MDM: CPT

## 2022-07-16 PROCEDURE — 93010 ELECTROCARDIOGRAM REPORT: CPT | Performed by: EMERGENCY MEDICINE

## 2022-07-16 PROCEDURE — 96374 THER/PROPH/DIAG INJ IV PUSH: CPT

## 2022-07-16 PROCEDURE — 36415 COLL VENOUS BLD VENIPUNCTURE: CPT

## 2022-07-16 PROCEDURE — 99284 EMERGENCY DEPT VISIT MOD MDM: CPT | Performed by: EMERGENCY MEDICINE

## 2022-07-16 PROCEDURE — 83690 ASSAY OF LIPASE: CPT | Performed by: EMERGENCY MEDICINE

## 2022-07-16 PROCEDURE — 96361 HYDRATE IV INFUSION ADD-ON: CPT

## 2022-07-16 PROCEDURE — 10002800 HB RX 250 W HCPCS: Performed by: EMERGENCY MEDICINE

## 2022-07-16 RX ORDER — ONDANSETRON 4 MG/1
4 TABLET, ORALLY DISINTEGRATING ORAL EVERY 12 HOURS PRN
Qty: 8 TABLET | Refills: 0 | Status: SHIPPED | OUTPATIENT
Start: 2022-07-16

## 2022-07-16 RX ORDER — ONDANSETRON 2 MG/ML
4 INJECTION INTRAMUSCULAR; INTRAVENOUS ONCE
Status: COMPLETED | OUTPATIENT
Start: 2022-07-16 | End: 2022-07-16

## 2022-07-16 RX ADMIN — SODIUM CHLORIDE 1000 ML: 9 INJECTION, SOLUTION INTRAVENOUS at 12:13

## 2022-07-16 RX ADMIN — ONDANSETRON 4 MG: 2 INJECTION INTRAMUSCULAR; INTRAVENOUS at 12:14

## 2022-07-16 ASSESSMENT — ENCOUNTER SYMPTOMS
PSYCHIATRIC NEGATIVE: 1
CONSTIPATION: 0
FEVER: 0
ANAL BLEEDING: 0
ABDOMINAL DISTENTION: 0
CHILLS: 0
FATIGUE: 0
BLOOD IN STOOL: 0
ABDOMINAL PAIN: 0
RECTAL PAIN: 0
APPETITE CHANGE: 0
DIARRHEA: 1
ACTIVITY CHANGE: 0
RESPIRATORY NEGATIVE: 1
NEUROLOGICAL NEGATIVE: 1
DIAPHORESIS: 0
VOMITING: 1
NAUSEA: 1

## 2022-07-16 ASSESSMENT — PAIN SCALES - GENERAL: PAINLEVEL_OUTOF10: 0

## 2022-07-18 LAB
ATRIAL RATE (BPM): 89
P AXIS (DEGREES): 63
PR-INTERVAL (MSEC): 120
QRS-INTERVAL (MSEC): 98
QT-INTERVAL (MSEC): 392
QTC: 477
R AXIS (DEGREES): 53
REPORT TEXT: NORMAL
T AXIS (DEGREES): 24
VENTRICULAR RATE EKG/MIN (BPM): 89

## 2022-09-15 DIAGNOSIS — I48.0 PAF (PAROXYSMAL ATRIAL FIBRILLATION) (CMD): ICD-10-CM

## 2022-09-15 DIAGNOSIS — Z79.899 HIGH RISK MEDICATION USE: ICD-10-CM

## 2022-09-15 DIAGNOSIS — R94.31 SHORTENED PR INTERVAL: ICD-10-CM

## 2022-09-15 DIAGNOSIS — Z79.01 ANTICOAGULATION ADEQUATE: ICD-10-CM

## 2022-10-05 ENCOUNTER — OFFICE VISIT (OUTPATIENT)
Dept: FAMILY MEDICINE | Facility: CLINIC | Age: 27
End: 2022-10-05
Payer: COMMERCIAL

## 2022-10-05 VITALS
DIASTOLIC BLOOD PRESSURE: 86 MMHG | SYSTOLIC BLOOD PRESSURE: 125 MMHG | WEIGHT: 155 LBS | TEMPERATURE: 98.2 F | OXYGEN SATURATION: 97 % | HEART RATE: 85 BPM

## 2022-10-05 DIAGNOSIS — J01.10 ACUTE NON-RECURRENT FRONTAL SINUSITIS: Primary | ICD-10-CM

## 2022-10-05 PROBLEM — Z86.19 HISTORY OF CLOSTRIDIUM DIFFICILE INFECTION: Status: ACTIVE | Noted: 2018-10-24

## 2022-10-05 PROBLEM — I48.0 PAF (PAROXYSMAL ATRIAL FIBRILLATION) (H): Status: ACTIVE | Noted: 2021-07-27

## 2022-10-05 PROBLEM — G90.A POTS (POSTURAL ORTHOSTATIC TACHYCARDIA SYNDROME): Status: ACTIVE | Noted: 2018-03-05

## 2022-10-05 PROBLEM — Z86.79 HISTORY OF WOLFF-PARKINSON-WHITE SYNDROME: Status: ACTIVE | Noted: 2022-01-25

## 2022-10-05 PROBLEM — K76.0 NAFLD (NONALCOHOLIC FATTY LIVER DISEASE): Status: ACTIVE | Noted: 2019-12-16

## 2022-10-05 PROCEDURE — 99213 OFFICE O/P EST LOW 20 MIN: CPT

## 2022-10-05 RX ORDER — PROMETHAZINE HYDROCHLORIDE 12.5 MG/1
1 TABLET ORAL
COMMUNITY
Start: 2021-12-21 | End: 2023-11-28

## 2022-10-05 RX ORDER — RIBOFLAVIN (VITAMIN B2) 400 MG
400 TABLET ORAL
COMMUNITY
Start: 2022-01-21

## 2022-10-05 RX ORDER — NARATRIPTAN 2.5 MG/1
2.5 TABLET ORAL
COMMUNITY
Start: 2021-12-10 | End: 2022-11-28

## 2022-10-05 RX ORDER — ELETRIPTAN HYDROBROMIDE 20 MG/1
TABLET, FILM COATED ORAL
COMMUNITY
Start: 2021-08-28

## 2022-10-05 RX ORDER — LOPERAMIDE HYDROCHLORIDE 2 MG/1
2 TABLET ORAL
COMMUNITY
Start: 2021-07-10

## 2022-10-05 RX ORDER — FLECAINIDE ACETATE 100 MG/1
100 TABLET ORAL
COMMUNITY
Start: 2021-04-05

## 2022-10-05 RX ORDER — CYPROHEPTADINE HYDROCHLORIDE 4 MG/1
1 TABLET ORAL
COMMUNITY
Start: 2022-03-24 | End: 2023-11-28

## 2022-10-05 RX ORDER — MIRTAZAPINE 15 MG/1
7.5 TABLET, FILM COATED ORAL
COMMUNITY
Start: 2022-03-21 | End: 2023-06-27

## 2022-10-05 RX ORDER — MIDODRINE HYDROCHLORIDE 5 MG/1
5 TABLET ORAL
COMMUNITY
Start: 2021-11-09

## 2022-10-05 RX ORDER — ACETAMINOPHEN 500 MG
1000 TABLET ORAL
COMMUNITY
End: 2023-04-24

## 2022-10-05 RX ORDER — DIPHENHYDRAMINE HCL 25 MG
1 CAPSULE ORAL
COMMUNITY

## 2022-10-05 RX ORDER — DOXYCYCLINE 100 MG/1
100 CAPSULE ORAL 2 TIMES DAILY
Qty: 14 CAPSULE | Refills: 0 | Status: SHIPPED | OUTPATIENT
Start: 2022-10-05 | End: 2022-10-12

## 2022-10-05 RX ORDER — EPTINEZUMAB-JJMR 100 MG/ML
100 INJECTION INTRAVENOUS
COMMUNITY
Start: 2022-01-21

## 2022-10-05 RX ORDER — NORTRIPTYLINE HCL 10 MG
10 CAPSULE ORAL
COMMUNITY
Start: 2021-12-10 | End: 2022-11-28

## 2022-10-05 RX ORDER — LIDOCAINE HYDROCHLORIDE 20 MG/ML
15 SOLUTION OROPHARYNGEAL
COMMUNITY
Start: 2022-06-15 | End: 2023-11-30

## 2022-10-05 RX ORDER — GUAIFENESIN 600 MG/1
1200 TABLET, EXTENDED RELEASE ORAL
COMMUNITY

## 2022-10-05 RX ORDER — IVABRADINE 5 MG/1
5 TABLET, FILM COATED ORAL 2 TIMES DAILY
COMMUNITY
Start: 2021-12-22 | End: 2023-01-13

## 2022-10-05 RX ORDER — ONDANSETRON 4 MG/1
TABLET, ORALLY DISINTEGRATING ORAL
COMMUNITY
Start: 2022-07-16 | End: 2022-11-28

## 2022-10-05 RX ORDER — PANTOPRAZOLE SODIUM 40 MG/1
40 TABLET, DELAYED RELEASE ORAL 2 TIMES DAILY
COMMUNITY

## 2022-10-05 RX ORDER — METOPROLOL TARTRATE 25 MG/1
TABLET, FILM COATED ORAL
COMMUNITY
Start: 2021-02-22

## 2022-10-05 RX ORDER — CHOLESTYRAMINE 4 G/9G
4 POWDER, FOR SUSPENSION ORAL
COMMUNITY
Start: 2022-01-20 | End: 2022-11-28

## 2022-10-05 NOTE — PROGRESS NOTES
Assessment & Plan     Acute non-recurrent frontal sinusitis    - doxycycline hyclate (VIBRAMYCIN) 100 MG capsule; Take 1 capsule (100 mg) by mouth 2 times daily for 7 days      15 minutes spent on the date of the encounter doing chart review, patient visit and documentation        See Patient Instructions    Return in about 1 week (around 10/12/2022), or if symptoms worsen or fail to improve.    [facility]  [facility]    Maicol Sharp is a 26 year old presenting for the following health issues:  Sinus Problem (Congestion, pressure, sinus headache 1 week)      HPI     Acute Illness  Acute illness concerns: sinus  Onset/Duration: 1 week  Symptoms:  Fever: No  Chills/Sweats: No  Headache (location?): YES  Sinus Pressure: YES  Conjunctivitis:  No  Ear Pain: no  Rhinorrhea: YES  Congestion: YES  Sore Throat: No  Cough: no  Wheeze: No  Decreased Appetite: No  Fatigue/Achiness: No  Sick/Strep Exposure: No  Therapies tried and outcome: Mucinex, Flonase and netti pot  Hx of deviated septum    Review of Systems   Constitutional, HEENT, cardiovascular, pulmonary, gi and gu systems are negative, except as otherwise noted.      Objective    /86 (BP Location: Right arm, Patient Position: Sitting, Cuff Size: Adult Regular)   Pulse 85   Temp 98.2  F (36.8  C) (Oral)   Wt 70.3 kg (155 lb)   SpO2 97%   There is no height or weight on file to calculate BMI.  Physical Exam   GENERAL: healthy, alert and no distress  EYES: Eyes grossly normal to inspection, PERRL and conjunctivae and sclerae normal  HENT: normal cephalic/atraumatic, ear canals and TM's normal, nasal mucosa edematous , oropharynx clear, oral mucous membranes moist and sinuses: maxillary, frontal tenderness on bilaterally  NECK: no adenopathy  RESP: lungs clear to auscultation - no rales, rhonchi or wheezes  CV: regular rates and rhythm, normal S1 S2, no S3 or S4 and no murmur, click  or rub

## 2022-10-11 ENCOUNTER — APPOINTMENT (OUTPATIENT)
Dept: ELECTROPHYSIOLOGY | Age: 27
End: 2022-10-11
Attending: INTERNAL MEDICINE

## 2022-10-26 ENCOUNTER — ANCILLARY PROCEDURE (OUTPATIENT)
Dept: GENERAL RADIOLOGY | Facility: CLINIC | Age: 27
End: 2022-10-26
Attending: NURSE PRACTITIONER
Payer: COMMERCIAL

## 2022-10-26 ENCOUNTER — OFFICE VISIT (OUTPATIENT)
Dept: FAMILY MEDICINE | Facility: CLINIC | Age: 27
End: 2022-10-26
Payer: COMMERCIAL

## 2022-10-26 VITALS
DIASTOLIC BLOOD PRESSURE: 85 MMHG | OXYGEN SATURATION: 96 % | HEART RATE: 88 BPM | SYSTOLIC BLOOD PRESSURE: 128 MMHG | TEMPERATURE: 98.1 F

## 2022-10-26 DIAGNOSIS — R11.0 NAUSEA: ICD-10-CM

## 2022-10-26 DIAGNOSIS — J34.89 SINUS PRESSURE: Primary | ICD-10-CM

## 2022-10-26 DIAGNOSIS — J34.89 SINUS PRESSURE: ICD-10-CM

## 2022-10-26 PROCEDURE — 70220 X-RAY EXAM OF SINUSES: CPT | Mod: TC | Performed by: RADIOLOGY

## 2022-10-26 PROCEDURE — 99214 OFFICE O/P EST MOD 30 MIN: CPT

## 2022-10-26 RX ORDER — ONDANSETRON 4 MG/1
4 TABLET, ORALLY DISINTEGRATING ORAL EVERY 8 HOURS PRN
Qty: 20 TABLET | Refills: 0 | Status: SHIPPED | OUTPATIENT
Start: 2022-10-26 | End: 2023-11-30

## 2022-10-26 RX ORDER — AZELASTINE 1 MG/ML
1 SPRAY, METERED NASAL 2 TIMES DAILY
Qty: 30 ML | Refills: 0 | Status: SHIPPED | OUTPATIENT
Start: 2022-10-26

## 2022-10-26 NOTE — PROGRESS NOTES
Assessment & Plan     Sinus pressure  Sinus films negative for infection.  Most likely his sinus pressure and congestion is due to his deviated septum.  He is following up with his primary care provider in a couple weeks to discuss.  Since Flonase seems to be not working for him, will try Astelin nasal spray.  Recommend nasal irrigation system such as [place] med    - XR Sinus Complete G/E 3 Views; Future  - azelastine (ASTELIN) 0.1 % nasal spray; Spray 1 spray into both nostrils 2 times daily    Nausea    - ondansetron (ZOFRAN ODT) 4 MG ODT tab; Take 1 tablet (4 mg) by mouth every 8 hours as needed for nausea      20 minutes spent on the date of the encounter doing chart review, review of test results, interpretation of tests and patient visit        See Patient Instructions    Return for Follow up as scheduled.    [facility]    Maicol Sharp is a 26 year old presenting for the following health issues:  Sinus Problem (Treatment was prescribed earlier this month, finished the supply and said the sinus infection went away at the time but has came back. Said he is now experiencing some nausea since the sinus infection has came back.)      HPI     Was treated for a sinus infection earlier this month states his symptoms got somewhat better but never really went away.  Has had issues with chronic sinusitis in the past and has a deviated septum.  Has seen 3 ENT providers who have suggested various treatments, none that were appealing to him.    Is also requesting some Zofran for nausea.  States this is usually due to his IBS, however has noticed that when his sinuses start to act up he also gets nausea.  Has used Zofran in the past for this.      Review of Systems   Constitutional, HEENT, cardiovascular, pulmonary, gi and gu systems are negative, except as otherwise noted.      Objective    /85 (BP Location: Right arm,  Patient Position: Sitting, Cuff Size: Adult Regular)   Pulse 88   Temp 98.1  F (36.7  C) (Oral)   SpO2 96%   There is no height or weight on file to calculate BMI.  Physical Exam   GENERAL: healthy, alert and no distress  EYES: Eyes grossly normal to inspection, PERRL and conjunctivae and sclerae normal  HENT: normal cephalic/atraumatic, ear canals and TM's normal, nose and mouth without ulcers or lesions, oropharynx clear and oral mucous membranes moist  NECK: no adenopathy, no asymmetry, masses, or scars and thyroid normal to palpation

## 2022-10-26 NOTE — PATIENT INSTRUCTIONS
Stop the Flonase and start Astelin  May use nasal saline spray  Use [place] med nasal irrigation system in the shower.  Follow up with your provider as scheduled to discuss your deviated septum.   Xray shows no sinus infection.

## 2022-11-01 ENCOUNTER — OFFICE VISIT (OUTPATIENT)
Dept: FAMILY MEDICINE | Facility: CLINIC | Age: 27
End: 2022-11-01
Payer: COMMERCIAL

## 2022-11-01 VITALS
HEART RATE: 112 BPM | WEIGHT: 159 LBS | BODY MASS INDEX: 28.17 KG/M2 | OXYGEN SATURATION: 97 % | HEIGHT: 63 IN | DIASTOLIC BLOOD PRESSURE: 89 MMHG | RESPIRATION RATE: 18 BRPM | SYSTOLIC BLOOD PRESSURE: 124 MMHG

## 2022-11-01 DIAGNOSIS — Z86.79 HISTORY OF ATRIAL FIBRILLATION: Primary | ICD-10-CM

## 2022-11-01 DIAGNOSIS — R06.02 SHORTNESS OF BREATH: ICD-10-CM

## 2022-11-01 DIAGNOSIS — R19.7 DIARRHEA, UNSPECIFIED TYPE: ICD-10-CM

## 2022-11-01 PROCEDURE — 99214 OFFICE O/P EST MOD 30 MIN: CPT

## 2022-11-01 RX ORDER — MONTELUKAST SODIUM 4 MG/1
1 TABLET, CHEWABLE ORAL 2 TIMES DAILY
Qty: 60 TABLET | Refills: 3 | Status: SHIPPED | OUTPATIENT
Start: 2022-11-01

## 2022-11-01 RX ORDER — ALBUTEROL SULFATE 90 UG/1
2 AEROSOL, METERED RESPIRATORY (INHALATION) EVERY 6 HOURS
Qty: 18 G | Refills: 3 | Status: SHIPPED | OUTPATIENT
Start: 2022-11-01

## 2022-11-01 ASSESSMENT — PAIN SCALES - GENERAL: PAINLEVEL: NO PAIN (0)

## 2022-11-01 NOTE — PROGRESS NOTES
Assessment & Plan     History of atrial fibrillation  - Adult Cardiology Eval  Referral; Future  For establishing care    Diarrhea, unspecified type  - colestipol (COLESTID) 1 g tablet; Take 1 tablet (1 g) by mouth 2 times daily  - Adult GI  Referral - Consult Only; Future  For establishing care    Shortness of breath  - albuterol (PROAIR HFA/PROVENTIL HFA/VENTOLIN HFA) 108 (90 Base) MCG/ACT inhaler; Inhale 2 puffs into the lungs every 6 hours  - General PFT Lab (Please always keep checked); Future  - Pulmonary Function Test; Future  We will perform asthma testing today per patient preference.  His symptoms would fit with classic asthma presentation, and he also reports history of acid reflux as well which may be contributing to his symptoms.  Low suspicion of infectious pathology due to absence of fever or chills.  Cardiac etiology is possible.  However, patient has normal heart sounds and regular rhythm today in no acute distress, and has already been following with cardiology while these symptoms have occurred.    Return in about 3 months (around 2/1/2023).    Abbe Chu NP  [facility]    Maicol Sharp is a 26 year old presenting for the following health issues:  Breathing Issues    Shortness of breath exertion for the past 5-6 years with some chest tightness symptoms. No nightly awakening. Second-hand smoke and asthma. Grew up with second hand smoke exposure. Living in place with black mold for past 5-6 years. A few years ago, patient was in the ER and received albuterol which was helpful. He reports that when symptoms flare, he typically has them throughout the day: triggered by cold air and exercise. He is wondering about being prescribed an albuterol inhaler today and receiving a test for asthma.  Family history of asthma    Dentist recommend getting off cholestyramine due to cavities. He is hoping to get on colestipol today.        History of Present  "Illness       Reason for visit:  Establish care,breathing issues,talk about one of my meds  Symptom onset:  More than a month  Symptoms include:  Shortness of breath upon exertion  Symptom intensity:  Moderate  Symptom progression:  Worsening  Had these symptoms before:  Yes  Has tried/received treatment for these symptoms:  Yes  Previous treatment was successful:  No  What makes it worse:  Exersize, cold air  What makes it better:  Rest    He eats 2-3 servings of fruits and vegetables daily.He consumes 1 sweetened beverage(s) daily.He exercises with enough effort to increase his heart rate 60 or more minutes per day.  He exercises with enough effort to increase his heart rate 7 days per week.   He is taking medications regularly.    Review of Systems   Constitutional, HEENT, cardiovascular, pulmonary, gi and gu systems are negative, except as otherwise noted.      Objective    /89   Pulse 112   Resp 18   Ht 1.595 m (5' 2.8\")   Wt 72.1 kg (159 lb)   SpO2 97%   BMI 28.35 kg/m    Body mass index is 28.35 kg/m .  Physical Exam   GENERAL: healthy, alert and no distress  EYES: Eyes grossly normal to inspection, PERRL and conjunctivae and sclerae normal  HENT: ear canals and TM's normal, nose and mouth without ulcers or lesions  NECK: no adenopathy, no asymmetry, masses, or scars and thyroid normal to palpation  RESP: lungs quiet, but clear to auscultation - no rales, rhonchi or wheezes  CV: regular rate and rhythm, normal S1 S2, no S3 or S4, no murmur, click or rub, no peripheral edema and peripheral pulses strong  ABDOMEN: soft, nontender, no hepatosplenomegaly, no masses and bowel sounds normal  MS: no gross musculoskeletal defects noted, no edema  SKIN: no suspicious lesions or rashes  NEURO: Normal strength and tone, mentation intact and speech normal  PSYCH: mentation appears normal, affect normal/bright              "

## 2022-11-08 ENCOUNTER — OFFICE VISIT (OUTPATIENT)
Dept: URGENT CARE | Facility: URGENT CARE | Age: 27
End: 2022-11-08
Payer: COMMERCIAL

## 2022-11-08 VITALS
SYSTOLIC BLOOD PRESSURE: 119 MMHG | TEMPERATURE: 98.1 F | HEART RATE: 94 BPM | OXYGEN SATURATION: 96 % | DIASTOLIC BLOOD PRESSURE: 82 MMHG

## 2022-11-08 DIAGNOSIS — J06.9 VIRAL URI WITH COUGH: ICD-10-CM

## 2022-11-08 DIAGNOSIS — R07.0 THROAT PAIN: Primary | ICD-10-CM

## 2022-11-08 PROBLEM — R00.0 WIDE-COMPLEX TACHYCARDIA: Status: ACTIVE | Noted: 2021-07-27

## 2022-11-08 PROBLEM — G43.709 CHRONIC MIGRAINE W/O AURA W/O STATUS MIGRAINOSUS, NOT INTRACTABLE: Status: ACTIVE | Noted: 2022-04-29

## 2022-11-08 PROBLEM — Z87.11 HISTORY OF GASTRIC ULCER: Status: ACTIVE | Noted: 2018-08-08

## 2022-11-08 PROBLEM — R19.7 DIARRHEA: Status: ACTIVE | Noted: 2019-10-10

## 2022-11-08 PROBLEM — K58.9 IBS (IRRITABLE BOWEL SYNDROME): Status: ACTIVE | Noted: 2021-07-23

## 2022-11-08 PROBLEM — K27.9 PUD (PEPTIC ULCER DISEASE): Status: ACTIVE | Noted: 2021-07-23

## 2022-11-08 PROBLEM — A49.8 RECURRENT CLOSTRIDIOIDES DIFFICILE INFECTION: Status: ACTIVE | Noted: 2020-06-30

## 2022-11-08 PROBLEM — I45.6 WPW (WOLFF-PARKINSON-WHITE SYNDROME): Status: ACTIVE | Noted: 2017-12-13

## 2022-11-08 PROBLEM — R10.9 ABDOMINAL PAIN: Status: ACTIVE | Noted: 2020-10-29

## 2022-11-08 PROBLEM — K82.4 GALLBLADDER POLYP: Status: ACTIVE | Noted: 2019-08-26

## 2022-11-08 PROBLEM — Z79.01 ANTICOAGULATION ADEQUATE: Status: ACTIVE | Noted: 2021-12-06

## 2022-11-08 PROBLEM — R00.2 PALPITATIONS: Status: ACTIVE | Noted: 2021-07-23

## 2022-11-08 PROBLEM — N39.0 RECURRENT UTI: Status: ACTIVE | Noted: 2019-12-06

## 2022-11-08 PROBLEM — R11.0 NAUSEA: Status: ACTIVE | Noted: 2020-01-27

## 2022-11-08 PROBLEM — B99.9 RECURRENT INFECTIONS: Status: ACTIVE | Noted: 2018-01-03

## 2022-11-08 PROBLEM — K21.9 GERD (GASTROESOPHAGEAL REFLUX DISEASE): Status: ACTIVE | Noted: 2021-07-23

## 2022-11-08 PROBLEM — R94.31 SHORTENED PR INTERVAL: Status: ACTIVE | Noted: 2021-07-23

## 2022-11-08 PROBLEM — F98.8 ADD (ATTENTION DEFICIT DISORDER): Status: ACTIVE | Noted: 2021-07-23

## 2022-11-08 LAB
DEPRECATED S PYO AG THROAT QL EIA: NEGATIVE
FLUAV AG SPEC QL IA: NEGATIVE
FLUBV AG SPEC QL IA: NEGATIVE
GROUP A STREP BY PCR: NOT DETECTED
SARS-COV-2 RNA RESP QL NAA+PROBE: NEGATIVE

## 2022-11-08 PROCEDURE — 87804 INFLUENZA ASSAY W/OPTIC: CPT | Mod: 59

## 2022-11-08 PROCEDURE — U0003 INFECTIOUS AGENT DETECTION BY NUCLEIC ACID (DNA OR RNA); SEVERE ACUTE RESPIRATORY SYNDROME CORONAVIRUS 2 (SARS-COV-2) (CORONAVIRUS DISEASE [COVID-19]), AMPLIFIED PROBE TECHNIQUE, MAKING USE OF HIGH THROUGHPUT TECHNOLOGIES AS DESCRIBED BY CMS-2020-01-R: HCPCS | Performed by: NURSE PRACTITIONER

## 2022-11-08 PROCEDURE — 87651 STREP A DNA AMP PROBE: CPT | Performed by: NURSE PRACTITIONER

## 2022-11-08 PROCEDURE — 99214 OFFICE O/P EST MOD 30 MIN: CPT | Mod: CS | Performed by: NURSE PRACTITIONER

## 2022-11-08 PROCEDURE — U0005 INFEC AGEN DETEC AMPLI PROBE: HCPCS | Performed by: NURSE PRACTITIONER

## 2022-11-08 NOTE — PATIENT INSTRUCTIONS
Rapid strep test today is negative.   Your throat culture is pending. Urgent Care will call if positive results to start antibiotics at that time; No call if the culture is negative.  Check mychart for covid result in a day  Flu negative  Drink plenty of fluids and rest.  May use salt water gargles- about 8 oz warm water with about 1 teaspoon salt  Sucrets and Cepacol spray are over the counter medications that numb the throat.  Over the counter pain relievers such as tylenol or ibuprofen may be used as needed.   Mucinex is product known to help loosen congestion and thin mucus (generic is guaifenesin)   Delsym 12 hour over the counter works well for cough.  Honey has been shown to be helpful in cough management and is soothing to a sore throat. May add to lemon tea.  Please follow up with primary care provider if not improving, worsening or new symptoms.

## 2022-11-08 NOTE — PROGRESS NOTES
Chief Complaint   Patient presents with     Urgent Care     Since yesterday pt has had sore throat, fever, body aches, no appetite, headache, took at home covids//negative     SUBJECTIVE:  Aron Barbosa is a 26 year old male presenting with sore throat cough body aches diminished appetite headache fever since yesterday.  He has had COVID and is vaccinated.  On albuterol sees pulmonology soon.    Past Medical History:   Diagnosis Date     Gastroesophageal reflux disease      Heart disease      Uncomplicated asthma      acetaminophen (TYLENOL) 500 MG tablet, Take 1,000 mg by mouth  albuterol (PROAIR HFA/PROVENTIL HFA/VENTOLIN HFA) 108 (90 Base) MCG/ACT inhaler, Inhale 2 puffs into the lungs every 6 hours  azelastine (ASTELIN) 0.1 % nasal spray, Spray 1 spray into both nostrils 2 times daily  cholestyramine (QUESTRAN) 4 g packet, Take 4 g by mouth  colestipol (COLESTID) 1 g tablet, Take 1 tablet (1 g) by mouth 2 times daily  cyproheptadine (PERIACTIN) 4 MG tablet, Take 1 tablet by mouth  diphenhydrAMINE (BENADRYL) 25 MG capsule, Take 1 capsule by mouth  eletriptan (RELPAX) 20 MG tablet, TAKE ONE TAB AT ONSET OF HEADACHE. MAY REPEAT IN 2HRS X 1 AS NEEDED. MAX 2 TABS IN 24 HRS.  eptinezumab-jjmr (VYEPTI) 100 MG/ML, Inject 100 mg into the vein  flecainide (TAMBOCOR) 100 MG tablet, Take 100 mg by mouth  guaiFENesin (MUCINEX) 600 MG 12 hr tablet, Take 1,200 mg by mouth  hyoscyamine SL (LEVSIN/SL) 0.125 MG sublingual tablet, Place 0.125 mg under the tongue  ivabradine (CORLANOR) 5 MG tablet, Take 5 mg by mouth 2 times daily  lidocaine, viscous, (XYLOCAINE) 2 % solution, Take 15 mLs by mouth  loperamide (IMODIUM A-D) 2 MG tablet, Take 2 mg by mouth  Magnesium Oxide 250 MG TABS, ONE BY MOUTH TWICE DAILY TAKE WITH FOOD.  metoprolol tartrate (LOPRESSOR) 25 MG tablet, 25 mg qAM and 12.5 mg qPM  midodrine (PROAMATINE) 5 MG tablet, Take 5 mg by mouth  mirtazapine (REMERON) 15 MG tablet, Take 7.5 mg by mouth  naratriptan (AMERGE)  2.5 MG tablet, Take 2.5 mg by mouth  nortriptyline (PAMELOR) 10 MG capsule, Take 10 mg by mouth  ondansetron (ZOFRAN ODT) 4 MG ODT tab, Take 1 tablet (4 mg) by mouth every 8 hours as needed for nausea  ondansetron (ZOFRAN ODT) 4 MG ODT tab,   pantoprazole (PROTONIX) 40 MG EC tablet, Take 40 mg by mouth 2 times daily  promethazine (PHENERGAN) 12.5 MG tablet, Take 1 tablet by mouth  Riboflavin 400 MG TABS, Take 400 mg by mouth    No current facility-administered medications on file prior to visit.    Social History     Tobacco Use     Smoking status: Never     Smokeless tobacco: Never   Substance Use Topics     Alcohol use: Yes     Comment: occa 1/2 per wk     Allergies   Allergen Reactions     Metoclopramide Anxiety, Nausea and Vomiting and Other (See Comments)     Other reaction(s): Anxiety, GI Upset, Nausea And Vomiting, OTHER (explain in comments), Other (See Comments)  Other reaction(s): ANXIETY, GI UPSET  Other reaction(s): OTHER (explain in comments)  Other reaction(s): ANXIETY, GI UPSET  Other reaction(s): GI Upset, Other (See Comments)  Other reaction(s): ANXIETY, GI UPSET  Other reaction(s): OTHER (explain in comments)  Other reaction(s): ANXIETY, GI UPSET  Other reaction(s): ANXIETY, GI UPSET    Other reaction(s): OTHER (explain in comments)  Other reaction(s): ANXIETY, GI UPSET    Other reaction(s): ANXIETY, GI UPSET  Other reaction(s): OTHER (explain in comments)  Other reaction(s): ANXIETY, GI UPSET  Other reaction(s): GI Upset, Other (See Comments)  Other reaction(s): ANXIETY, GI UPSET  Other reaction(s): OTHER (explain in comments)  Other reaction(s): ANXIETY, GI UPSET  Other reaction(s): ANXIETY, GI UPSET  Other reaction(s): ANXIETY, GI UPSET  Other reaction(s): OTHER (explain in comments)  Other reaction(s): ANXIETY, GI UPSET  Other reaction(s): ANXIETY, GI UPSET  Other reaction(s): ANXIETY, GI UPSET       Nicotine Shortness Of Breath     Nicotiana Tabacum      Other reaction(s): OTHER (explain in  comments)  Allergic reaction to exposure to vaping - respiratory distress and wheezing     Smoke.      Other reaction(s): Other (See Comments)  Allergic reaction to exposure to vaping - respiratory distress and wheezing  Allergic reaction to exposure to vaping - respiratory distress and wheezing     Amoxicillin Rash     Other reaction(s): Rash, SKIN - rash  Other reaction(s): RASH  Other reaction(s): RASH  Other reaction(s): SKIN - rash  Other reaction(s): RASH  Other reaction(s): RASH  Other reaction(s): RASH  Other reaction(s): RASH    Other reaction(s): RASH  Rash as a toddler  Rash as a toddler    Other reaction(s): SKIN - rash  Other reaction(s): RASH    Other reaction(s): RASH  Rash as a toddler  Other reaction(s): RASH  Other reaction(s): RASH  Other reaction(s): SKIN - rash  Other reaction(s): RASH  Other reaction(s): RASH  Other reaction(s): RASH  Other reaction(s): RASH    Other reaction(s): RASH  Rash as a toddler  Rash as a toddler  Other reaction(s): RASH  Other reaction(s): RASH  Other reaction(s): RASH  Tolerates cephalosporins  Rash as a toddler       Prochlorperazine Anxiety       Review of Systems   All systems negative except for those listed above in HPI.    OBJECTIVE:   /82 (BP Location: Left arm, Patient Position: Sitting, Cuff Size: Adult Regular)   Pulse 94   Temp 98.1  F (36.7  C) (Oral)   SpO2 96%      Physical Exam  Vitals reviewed.   Constitutional:       General: He is not in acute distress.     Appearance: Normal appearance. He is ill-appearing. He is not toxic-appearing or diaphoretic.   HENT:      Head: Normocephalic and atraumatic.      Right Ear: Tympanic membrane and ear canal normal.      Left Ear: Tympanic membrane and ear canal normal.      Nose: Congestion and rhinorrhea present.      Mouth/Throat:      Mouth: Mucous membranes are moist.      Pharynx: No oropharyngeal exudate or posterior oropharyngeal erythema.   Cardiovascular:      Rate and Rhythm: Normal rate.       Pulses: Normal pulses.   Pulmonary:      Effort: Respiratory distress present.      Breath sounds: Normal breath sounds. No stridor. No wheezing, rhonchi or rales.   Chest:      Chest wall: No tenderness.   Abdominal:      General: Abdomen is flat.      Palpations: Abdomen is soft.   Musculoskeletal:         General: Normal range of motion.      Cervical back: Normal range of motion and neck supple.   Lymphadenopathy:      Cervical: No cervical adenopathy.   Skin:     General: Skin is warm and dry.      Findings: No rash.   Neurological:      General: No focal deficit present.      Mental Status: He is alert and oriented to person, place, and time.   Psychiatric:         Mood and Affect: Mood normal.         Behavior: Behavior normal.       ASSESSMENT:    ICD-10-CM    1. Throat pain  R07.0 Symptomatic; Unknown COVID-19 Virus (Coronavirus) by PCR Nose     Streptococcus A Rapid Screen w/Reflex to PCR - Clinic Collect     Influenza A/B antigen     Group A Streptococcus PCR Throat Swab     magic mouthwash (ENTER INGREDIENTS IN COMMENTS) suspension      2. Viral URI with cough  J06.9         PLAN:    Rapid strep test today is negative.   Your throat culture is pending. Urgent Care will call if positive results to start antibiotics at that time; No call if the culture is negative.  Check mychart for covid result in a day  Flu negative  Drink plenty of fluids and rest.  May use salt water gargles- about 8 oz warm water with about 1 teaspoon salt  Sucrets and Cepacol spray are over the counter medications that numb the throat.  Over the counter pain relievers such as tylenol or ibuprofen may be used as needed.   Mucinex is product known to help loosen congestion and thin mucus (generic is guaifenesin)   Delsym 12 hour over the counter works well for cough.  Honey has been shown to be helpful in cough management and is soothing to a sore throat. May add to lemon tea.  Please follow up with primary care provider if not  improving, worsening or new symptoms.    Follow up with primary care provider with any problems, questions or concerns or if symptoms worsen or fail to improve. Patient agreed to plan and verbalized understanding.    RICHARD Wallace-BC  [facility]

## 2022-11-08 NOTE — TELEPHONE ENCOUNTER
"RECORDS RECEIVED FROM: Internal, external   DATE RECEIVED: 11.30.22   NOTES STATUS DETAILS   OFFICE NOTE from referring provider    Internal 11.1.22 BRANDAN Chu [place]   OFFICE NOTE from other cardiologists   and neurologists Care Everywhere 4.12.22, 12.7.21 [facility]  More in CE if needed   DISCHARGE SUMMARY from hospital    Care Everywhere 11.8.21 [facility]   DISCHARGE REPORT from the ER       OPERATIVE REPORT    Care Everywhere    MEDICATION LIST   Care Everywhere    LABS     BMP   N/A    CBC   Care Everywhere 7.16.22   CMP   Care Everywhere 7.16.22   Lipids   N/A    TSH   Care Everywhere 5.21.20   DIAGNOSTIC PROCEDURES     EKG  Strips *important In process 9.17.21 [facility] [place] [facility]   Monitor Reports  Strips *important     Cardioversions       ICD/pacemaker implant       Tilt table studies       IMAGING (DISC & REPORT)      ECHO's   In process 7.10.22 [facility]   Stress Tests       Cath       CT/CTA       MRI/MRA   In process 8.13.21 [facility] [place]     Action 11.8.22 MJ   Action Taken Requested EKG strips from [facility]  Requested images from [facility]     Action 12/14/22 MV 12.42pm   Action Taken Imaging disk received from [facility]. Tried uploading disk but there is a password that was sent separately. [employer] sent to Prakash.  UPDATE: password \"[email or web address]\" rec'd from [facility]. Gave Jung the imaging disk to process since he knows how to retrieve encrypted images and I do not.             "

## 2022-11-28 ENCOUNTER — APPOINTMENT (OUTPATIENT)
Dept: GENERAL RADIOLOGY | Facility: CLINIC | Age: 27
End: 2022-11-28
Attending: EMERGENCY MEDICINE
Payer: COMMERCIAL

## 2022-11-28 ENCOUNTER — HOSPITAL ENCOUNTER (EMERGENCY)
Facility: CLINIC | Age: 27
Discharge: HOME | End: 2022-11-28
Attending: EMERGENCY MEDICINE | Admitting: EMERGENCY MEDICINE
Payer: COMMERCIAL

## 2022-11-28 ENCOUNTER — TRANSFERRED RECORDS (OUTPATIENT)
Dept: HEALTH INFORMATION MANAGEMENT | Facility: CLINIC | Age: 27
End: 2022-11-28

## 2022-11-28 VITALS
SYSTOLIC BLOOD PRESSURE: 112 MMHG | RESPIRATION RATE: 16 BRPM | WEIGHT: 155 LBS | HEIGHT: 64 IN | DIASTOLIC BLOOD PRESSURE: 74 MMHG | HEART RATE: 91 BPM | TEMPERATURE: 98.9 F | BODY MASS INDEX: 26.46 KG/M2 | OXYGEN SATURATION: 99 %

## 2022-11-28 DIAGNOSIS — I49.8 NODAL RHYTHM DISORDER: ICD-10-CM

## 2022-11-28 DIAGNOSIS — J10.1 INFLUENZA A: ICD-10-CM

## 2022-11-28 DIAGNOSIS — Z11.52 ENCOUNTER FOR SCREENING LABORATORY TESTING FOR SEVERE ACUTE RESPIRATORY SYNDROME CORONAVIRUS 2 (SARS-COV-2): ICD-10-CM

## 2022-11-28 LAB
ANION GAP SERPL CALCULATED.3IONS-SCNC: 11 MMOL/L (ref 3–14)
ATRIAL RATE - MUSE: 102 BPM
BASOPHILS # BLD AUTO: 0 10E3/UL (ref 0–0.2)
BASOPHILS NFR BLD AUTO: 0 %
BUN SERPL-MCNC: 14 MG/DL (ref 7–30)
CALCIUM SERPL-MCNC: 9.2 MG/DL (ref 8.5–10.1)
CHLORIDE BLD-SCNC: 100 MMOL/L (ref 94–109)
CO2 SERPL-SCNC: 25 MMOL/L (ref 20–32)
CREAT SERPL-MCNC: 0.91 MG/DL (ref 0.66–1.25)
D DIMER PPP FEU-MCNC: <0.27 UG/ML FEU (ref 0–0.5)
DIASTOLIC BLOOD PRESSURE - MUSE: NORMAL MMHG
EOSINOPHIL # BLD AUTO: 0 10E3/UL (ref 0–0.7)
EOSINOPHIL NFR BLD AUTO: 0 %
ERYTHROCYTE [DISTWIDTH] IN BLOOD BY AUTOMATED COUNT: 11.7 % (ref 10–15)
FLUAV RNA SPEC QL NAA+PROBE: POSITIVE
FLUBV RNA RESP QL NAA+PROBE: NEGATIVE
GFR SERPL CREATININE-BSD FRML MDRD: >90 ML/MIN/1.73M2
GLUCOSE BLD-MCNC: 84 MG/DL (ref 70–99)
HCT VFR BLD AUTO: 43.9 % (ref 40–53)
HGB BLD-MCNC: 15.3 G/DL (ref 13.3–17.7)
IMM GRANULOCYTES # BLD: 0 10E3/UL
IMM GRANULOCYTES NFR BLD: 0 %
INTERPRETATION ECG - MUSE: NORMAL
LYMPHOCYTES # BLD AUTO: 1.3 10E3/UL (ref 0.8–5.3)
LYMPHOCYTES NFR BLD AUTO: 23 %
MCH RBC QN AUTO: 29.3 PG (ref 26.5–33)
MCHC RBC AUTO-ENTMCNC: 34.9 G/DL (ref 31.5–36.5)
MCV RBC AUTO: 84 FL (ref 78–100)
MONOCYTES # BLD AUTO: 1.1 10E3/UL (ref 0–1.3)
MONOCYTES NFR BLD AUTO: 20 %
NEUTROPHILS # BLD AUTO: 3.2 10E3/UL (ref 1.6–8.3)
NEUTROPHILS NFR BLD AUTO: 57 %
NRBC # BLD AUTO: 0 10E3/UL
NRBC BLD AUTO-RTO: 0 /100
NT-PROBNP SERPL-MCNC: 21 PG/ML (ref 0–450)
P AXIS - MUSE: 23 DEGREES
PLATELET # BLD AUTO: 284 10E3/UL (ref 150–450)
POTASSIUM BLD-SCNC: 3.3 MMOL/L (ref 3.4–5.3)
PR INTERVAL - MUSE: 130 MS
QRS DURATION - MUSE: 106 MS
QT - MUSE: 340 MS
QTC - MUSE: 443 MS
R AXIS - MUSE: 23 DEGREES
RBC # BLD AUTO: 5.23 10E6/UL (ref 4.4–5.9)
RSV RNA SPEC NAA+PROBE: NEGATIVE
SARS-COV-2 RNA RESP QL NAA+PROBE: NEGATIVE
SODIUM SERPL-SCNC: 136 MMOL/L (ref 133–144)
SYSTOLIC BLOOD PRESSURE - MUSE: NORMAL MMHG
T AXIS - MUSE: -5 DEGREES
TROPONIN I SERPL HS-MCNC: 5 NG/L
TROPONIN I SERPL HS-MCNC: 6 NG/L
VENTRICULAR RATE- MUSE: 102 BPM
WBC # BLD AUTO: 5.6 10E3/UL (ref 4–11)

## 2022-11-28 PROCEDURE — 85025 COMPLETE CBC W/AUTO DIFF WBC: CPT | Performed by: EMERGENCY MEDICINE

## 2022-11-28 PROCEDURE — 250N000013 HC RX MED GY IP 250 OP 250 PS 637: Performed by: EMERGENCY MEDICINE

## 2022-11-28 PROCEDURE — 93005 ELECTROCARDIOGRAM TRACING: CPT

## 2022-11-28 PROCEDURE — 258N000003 HC RX IP 258 OP 636: Performed by: EMERGENCY MEDICINE

## 2022-11-28 PROCEDURE — 71046 X-RAY EXAM CHEST 2 VIEWS: CPT

## 2022-11-28 PROCEDURE — 83880 ASSAY OF NATRIURETIC PEPTIDE: CPT | Performed by: EMERGENCY MEDICINE

## 2022-11-28 PROCEDURE — 87637 SARSCOV2&INF A&B&RSV AMP PRB: CPT | Performed by: EMERGENCY MEDICINE

## 2022-11-28 PROCEDURE — 96374 THER/PROPH/DIAG INJ IV PUSH: CPT

## 2022-11-28 PROCEDURE — 85379 FIBRIN DEGRADATION QUANT: CPT | Performed by: EMERGENCY MEDICINE

## 2022-11-28 PROCEDURE — 99285 EMERGENCY DEPT VISIT HI MDM: CPT | Mod: CS | Performed by: EMERGENCY MEDICINE

## 2022-11-28 PROCEDURE — 82310 ASSAY OF CALCIUM: CPT | Performed by: EMERGENCY MEDICINE

## 2022-11-28 PROCEDURE — 36415 COLL VENOUS BLD VENIPUNCTURE: CPT | Performed by: EMERGENCY MEDICINE

## 2022-11-28 PROCEDURE — 96361 HYDRATE IV INFUSION ADD-ON: CPT

## 2022-11-28 PROCEDURE — C9803 HOPD COVID-19 SPEC COLLECT: HCPCS

## 2022-11-28 PROCEDURE — 93010 ELECTROCARDIOGRAM REPORT: CPT | Performed by: EMERGENCY MEDICINE

## 2022-11-28 PROCEDURE — 84484 ASSAY OF TROPONIN QUANT: CPT | Performed by: EMERGENCY MEDICINE

## 2022-11-28 PROCEDURE — 250N000011 HC RX IP 250 OP 636: Performed by: EMERGENCY MEDICINE

## 2022-11-28 PROCEDURE — 258N000003 HC RX IP 258 OP 636

## 2022-11-28 PROCEDURE — 99285 EMERGENCY DEPT VISIT HI MDM: CPT | Mod: CS,25

## 2022-11-28 RX ORDER — SODIUM CHLORIDE 9 MG/ML
INJECTION, SOLUTION INTRAVENOUS
Status: COMPLETED
Start: 2022-11-28 | End: 2022-11-28

## 2022-11-28 RX ORDER — SODIUM CHLORIDE 9 MG/ML
INJECTION, SOLUTION INTRAVENOUS CONTINUOUS
Status: DISCONTINUED | OUTPATIENT
Start: 2022-11-28 | End: 2022-11-29 | Stop reason: HOSPADM

## 2022-11-28 RX ORDER — ACETAMINOPHEN 500 MG
1000 TABLET ORAL ONCE
Status: COMPLETED | OUTPATIENT
Start: 2022-11-28 | End: 2022-11-28

## 2022-11-28 RX ORDER — OSELTAMIVIR PHOSPHATE 75 MG/1
75 CAPSULE ORAL 2 TIMES DAILY
Qty: 10 CAPSULE | Refills: 0 | Status: SHIPPED | OUTPATIENT
Start: 2022-11-28 | End: 2022-12-03

## 2022-11-28 RX ORDER — BENZONATATE 100 MG/1
100 CAPSULE ORAL 3 TIMES DAILY PRN
Qty: 30 CAPSULE | Refills: 0 | Status: SHIPPED | OUTPATIENT
Start: 2022-11-28 | End: 2023-06-27

## 2022-11-28 RX ORDER — ONDANSETRON 2 MG/ML
4 INJECTION INTRAMUSCULAR; INTRAVENOUS ONCE
Status: COMPLETED | OUTPATIENT
Start: 2022-11-28 | End: 2022-11-28

## 2022-11-28 RX ORDER — ONDANSETRON 4 MG/1
4 TABLET, ORALLY DISINTEGRATING ORAL ONCE
Status: DISCONTINUED | OUTPATIENT
Start: 2022-11-28 | End: 2022-11-28 | Stop reason: CLARIF

## 2022-11-28 RX ORDER — OMEGA-3 FATTY ACIDS/FISH OIL 300-1000MG
200 CAPSULE ORAL EVERY 4 HOURS PRN
COMMUNITY
End: 2023-04-24

## 2022-11-28 RX ORDER — IBUPROFEN 600 MG/1
600 TABLET, FILM COATED ORAL ONCE
Status: COMPLETED | OUTPATIENT
Start: 2022-11-28 | End: 2022-11-28

## 2022-11-28 RX ADMIN — IBUPROFEN 600 MG: 600 TABLET ORAL at 20:42

## 2022-11-28 RX ADMIN — ONDANSETRON 4 MG: 2 INJECTION INTRAMUSCULAR; INTRAVENOUS at 18:04

## 2022-11-28 RX ADMIN — SODIUM CHLORIDE: 9 INJECTION, SOLUTION INTRAVENOUS at 21:00

## 2022-11-28 RX ADMIN — SODIUM CHLORIDE 1000 ML: 9 INJECTION, SOLUTION INTRAVENOUS at 18:04

## 2022-11-28 RX ADMIN — ACETAMINOPHEN 1000 MG: 500 TABLET ORAL at 20:42

## 2022-11-28 ASSESSMENT — ACTIVITIES OF DAILY LIVING (ADL)
ADLS_ACUITY_SCORE: 35
ADLS_ACUITY_SCORE: 33
ADLS_ACUITY_SCORE: 35
ADLS_ACUITY_SCORE: 35

## 2022-11-28 ASSESSMENT — ENCOUNTER SYMPTOMS
COUGH: 1
FEVER: 1
SHORTNESS OF BREATH: 1
WEAKNESS: 1

## 2022-11-28 NOTE — ED TRIAGE NOTES
Patient presents due to dry cough, fever (tylenol taken this AM), generalized weakness and shortness of breath. Patient reports symptoms present for 2-3 weeks. Patient reports symptoms getting better and then returning over the past 3 weeks. Patient reports shortness of breath worsening in the last 3 days.   Triage Assessment     Row Name 11/28/22 1541       Triage Assessment (Adult)    Airway WDL WDL       Respiratory WDL    Respiratory WDL X;rhythm/pattern;cough    Rhythm/Pattern, Respiratory shortness of breath    Cough Frequency frequent    Cough Type dry       Skin Circulation/Temperature WDL    Skin Circulation/Temperature WDL WDL       Cardiac WDL    Cardiac WDL WDL       Peripheral/Neurovascular WDL    Peripheral Neurovascular WDL WDL       Cognitive/Neuro/Behavioral WDL    Cognitive/Neuro/Behavioral WDL WDL

## 2022-11-28 NOTE — ED PROVIDER NOTES
[facility] EMERGENCY DEPARTMENT ([facility])  11/28/22  History     Chief Complaint   Patient presents with     Shortness of Breath     Patient presents due to dry cough, fever (tylenol taken this AM), generalized weakness and shortness of breath. Patient reports symptoms present for 2-3 weeks. Patient reports symptoms getting better and then returning over the past 3 weeks. Patient reports shortness of breath worsening in the last 3 days.     The history is provided by the patient and medical records.   Shortness of Breath  Associated symptoms: chest pain, cough and fever      Aron Barbosa is a 26 year old male with a history notable for WPW s/p ablation, POTS who presents to the ED for evaluation of shortness of breath.  Patient notes feeling relatively sick for the past 3 weeks.  Patient initially developed a dry cough, fever, generalized weakness and shortness of breath.  The patient's symptoms resolved for approximately a week but then came back about 3 days ago.  He notes that shortness of breath has been persistent and last night worsened with lying flat at night.  Patient was recently diagnosed with asthma and has shortness of breath with exertion at baseline.  He has been using his albuterol inhaler with little relief.  Patient endorses chest pain with his cough only, no other chest discomfort.  He notes his fever has reached a high as 103 degrees.  Patient has taken Tylenol and ibuprofen which slightly improved his symptoms.  He also endorses generalized weakness.  Denies nausea vomiting. patient has been able to drink liquids but has had a decreased appetite and intake of food.  Patient denies pain or swelling in his lower extremities.  He denies a history of DVT/PEs.  Patient is not currently anticoagulated.  The patient did not receive the influenza vaccine.  Patient did present to urgent care a couple of weeks ago and tested negative for influenza and COVID.  Of note, the patient's mother  has similar symptoms to the patient and was diagnosed with pneumonia earlier today.      I have reviewed the Medications, Allergies, Past Medical and Surgical History, and Social History in the [employer] system.  PAST MEDICAL HISTORY:   Past Medical History:   Diagnosis Date     Gastroesophageal reflux disease      Heart disease      Uncomplicated asthma        PAST SURGICAL HISTORY:   Past Surgical History:   Procedure Laterality Date     APPENDECTOMY       CHOLECYSTECTOMY       tonsilctomy         Past medical history, past surgical history, medications, and allergies were reviewed with the patient. Additional pertinent items: None    FAMILY HISTORY:   Family History   Problem Relation Age of Onset     Uterine Cancer Sister      Asthma Brother      Atrial fibrillation Maternal Grandmother      Asthma Paternal Grandmother        SOCIAL HISTORY:   Social History     Tobacco Use     Smoking status: Never     Smokeless tobacco: Never   Substance Use Topics     Alcohol use: Yes     Comment: occa 1/2 per wk     Social history was reviewed with the patient. Additional pertinent items: None      Patient's Medications   New Prescriptions    BENZONATATE (TESSALON) 100 MG CAPSULE    Take 1 capsule (100 mg) by mouth 3 times daily as needed for cough    OSELTAMIVIR (TAMIFLU) 75 MG CAPSULE    Take 1 capsule (75 mg) by mouth 2 times daily for 5 days   Previous Medications    ACETAMINOPHEN (TYLENOL) 500 MG TABLET    Take 1,000 mg by mouth    ALBUTEROL (PROAIR HFA/PROVENTIL HFA/VENTOLIN HFA) 108 (90 BASE) MCG/ACT INHALER    Inhale 2 puffs into the lungs every 6 hours    AZELASTINE (ASTELIN) 0.1 % NASAL SPRAY    Spray 1 spray into both nostrils 2 times daily    COLESTIPOL (COLESTID) 1 G TABLET    Take 1 tablet (1 g) by mouth 2 times daily    CYPROHEPTADINE (PERIACTIN) 4 MG TABLET    Take 1 tablet by mouth    DIPHENHYDRAMINE (BENADRYL) 25 MG CAPSULE    Take 1 capsule by mouth    ELETRIPTAN (RELPAX) 20 MG TABLET    TAKE ONE TAB AT ONSET  OF HEADACHE. MAY REPEAT IN 2HRS X 1 AS NEEDED. MAX 2 TABS IN 24 HRS.    EPTINEZUMAB-JJMR (VYEPTI) 100 MG/ML    Inject 100 mg into the vein    FLECAINIDE (TAMBOCOR) 100 MG TABLET    Take 100 mg by mouth    GUAIFENESIN (MUCINEX) 600 MG 12 HR TABLET    Take 1,200 mg by mouth    HYOSCYAMINE SL (LEVSIN/SL) 0.125 MG SUBLINGUAL TABLET    Place 0.125 mg under the tongue    IBUPROFEN (ADVIL/MOTRIN) 200 MG CAPSULE    Take 200 mg by mouth every 4 hours as needed for fever    IVABRADINE (CORLANOR) 5 MG TABLET    Take 5 mg by mouth 2 times daily    LIDOCAINE, VISCOUS, (XYLOCAINE) 2 % SOLUTION    Take 15 mLs by mouth    LOPERAMIDE (IMODIUM A-D) 2 MG TABLET    Take 2 mg by mouth    MAGNESIUM OXIDE 250 MG TABS    ONE BY MOUTH TWICE DAILY TAKE WITH FOOD.    METOPROLOL TARTRATE (LOPRESSOR) 25 MG TABLET    25 mg qAM and 12.5 mg qPM    MIDODRINE (PROAMATINE) 5 MG TABLET    Take 5 mg by mouth    MIRTAZAPINE (REMERON) 15 MG TABLET    Take 7.5 mg by mouth    ONDANSETRON (ZOFRAN ODT) 4 MG ODT TAB    Take 1 tablet (4 mg) by mouth every 8 hours as needed for nausea    PANTOPRAZOLE (PROTONIX) 40 MG EC TABLET    Take 40 mg by mouth 2 times daily    PROMETHAZINE (PHENERGAN) 12.5 MG TABLET    Take 1 tablet by mouth    RIBOFLAVIN 400 MG TABS    Take 400 mg by mouth   Modified Medications    No medications on file   Discontinued Medications    CHOLESTYRAMINE (QUESTRAN) 4 G PACKET    Take 4 g by mouth    NARATRIPTAN (AMERGE) 2.5 MG TABLET    Take 2.5 mg by mouth    NORTRIPTYLINE (PAMELOR) 10 MG CAPSULE    Take 10 mg by mouth    ONDANSETRON (ZOFRAN ODT) 4 MG ODT TAB              Allergies   Allergen Reactions     Metoclopramide Anxiety, Nausea and Vomiting and Other (See Comments)     Other reaction(s): Anxiety, GI Upset, Nausea And Vomiting, OTHER (explain in comments), Other (See Comments)  Other reaction(s): ANXIETY, GI UPSET  Other reaction(s): OTHER (explain in comments)  Other reaction(s): ANXIETY, GI UPSET  Other reaction(s): GI Upset, Other  (See Comments)  Other reaction(s): ANXIETY, GI UPSET  Other reaction(s): OTHER (explain in comments)  Other reaction(s): ANXIETY, GI UPSET  Other reaction(s): ANXIETY, GI UPSET    Other reaction(s): OTHER (explain in comments)  Other reaction(s): ANXIETY, GI UPSET    Other reaction(s): ANXIETY, GI UPSET  Other reaction(s): OTHER (explain in comments)  Other reaction(s): ANXIETY, GI UPSET  Other reaction(s): GI Upset, Other (See Comments)  Other reaction(s): ANXIETY, GI UPSET  Other reaction(s): OTHER (explain in comments)  Other reaction(s): ANXIETY, GI UPSET  Other reaction(s): ANXIETY, GI UPSET  Other reaction(s): ANXIETY, GI UPSET  Other reaction(s): OTHER (explain in comments)  Other reaction(s): ANXIETY, GI UPSET  Other reaction(s): ANXIETY, GI UPSET  Other reaction(s): ANXIETY, GI UPSET       Nicotine Shortness Of Breath     Nicotiana Tabacum      Other reaction(s): OTHER (explain in comments)  Allergic reaction to exposure to vaping - respiratory distress and wheezing     Smoke.      Other reaction(s): Other (See Comments)  Allergic reaction to exposure to vaping - respiratory distress and wheezing  Allergic reaction to exposure to vaping - respiratory distress and wheezing     Amoxicillin Rash     Other reaction(s): Rash, SKIN - rash  Other reaction(s): RASH  Other reaction(s): RASH  Other reaction(s): SKIN - rash  Other reaction(s): RASH  Other reaction(s): RASH  Other reaction(s): RASH  Other reaction(s): RASH    Other reaction(s): RASH  Rash as a toddler  Rash as a toddler    Other reaction(s): SKIN - rash  Other reaction(s): RASH    Other reaction(s): RASH  Rash as a toddler  Other reaction(s): RASH  Other reaction(s): RASH  Other reaction(s): SKIN - rash  Other reaction(s): RASH  Other reaction(s): RASH  Other reaction(s): RASH  Other reaction(s): RASH    Other reaction(s): RASH  Rash as a toddler  Rash as a toddler  Other reaction(s): RASH  Other reaction(s): RASH  Other reaction(s): RASH  Tolerates  "cephalosporins  Rash as a toddler       Prochlorperazine Anxiety        Review of Systems   Constitutional: Positive for fever.   Respiratory: Positive for cough and shortness of breath.    Cardiovascular: Positive for chest pain. Negative for leg swelling.   Neurological: Positive for weakness.     A complete review of systems was performed with pertinent positives and negatives noted in the HPI, and all other systems negative.    Physical Exam   BP: (!) 126/93  Pulse: 102  Temp: 98.9  F (37.2  C)  Resp: 18  Height: 162.6 cm (5' 4\")  Weight: 70.3 kg (155 lb)  SpO2: 97 %      Physical Exam  General: awake, alert, unwell appearing but not toxic  Head: normal cephalic  HEENT: pupils equal, conjugate gaze intact  Neck: Supple  CV: Tachycardic, no murmur  Lungs: clear to auscultation  Abd: soft, non-tender, no guarding, no peritoneal signs  EXT: lower extremities without swelling or edema  Neuro: awake, answers questions appropriately. No focal deficits noted       ED Course   4:51 PM  The patient was seen and examined by Dr. Zay Sultana in Room ED 04.        Procedures            EKG Interpretation:      Interpreted by Zay Sultana MD  Time reviewed: 1719  Symptoms at time of EKG: SOB  Rhythm: normal sinus   Rate: normal  Axis: normal  Ectopy: none  Conduction: normal  ST Segments/ T Waves: T wave and ST changes in the lateral leads.  Q Waves: none  Comparison to prior: No old EKG available    Clinical Impression: T wave and ST changes           The medical record was reviewed and interpreted.  Current labs reviewed and interpreted.  Previous labs reviewed and interpreted.    Results for orders placed or performed during the hospital encounter of 11/28/22 (from the past 24 hour(s))   Symptomatic; Yes; 11/21/2022 Influenza A/B & SARS-CoV2 (COVID-19) Virus PCR Multiplex Nasopharyngeal    Specimen: Nasopharyngeal; Swab   Result Value Ref Range    Influenza A PCR Positive (A) Negative    Influenza B PCR Negative Negative "    RSV PCR Negative Negative    SARS CoV2 PCR Negative Negative    Narrative    Testing was performed using the Xpert Xpress CoV2/Flu/RSV Assay on the [employer] GeneXpert Instrument. This test should be ordered for the detection of SARS-CoV-2 and influenza viruses in individuals who meet clinical and/or epidemiological criteria. Test performance is unknown in asymptomatic patients. This test is for in vitro diagnostic use under the FDA EUA for laboratories certified under CLIA to perform high or moderate complexity testing. This test has not been FDA cleared or approved. A negative result does not rule out the presence of PCR inhibitors in the specimen or target RNA in concentration below the limit of detection for the assay. If only one viral target is positive but coinfection with multiple targets is suspected, the sample should be re-tested with another FDA cleared, approved, or authorized test, if coinfection would change clinical management. This test was validated by the [facility]. These laboratories are certified under the Clinical Laboratory Improvement Amendments of 1988 (CLIA-88) as qualified to perform high complexity laboratory testing.   EKG 12-lead, tracing only   Result Value Ref Range    Systolic Blood Pressure  mmHg    Diastolic Blood Pressure  mmHg    Ventricular Rate 102 BPM    Atrial Rate 102 BPM    NC Interval 130 ms    QRS Duration 106 ms     ms    QTc 443 ms    P Axis 23 degrees    R AXIS 23 degrees    T Axis -5 degrees    Interpretation ECG       Sinus tachycardia  ST & T wave abnormality, consider inferior ischemia  Abnormal ECG     Troponin I   Result Value Ref Range    Troponin I High Sensitivity 5 <79 ng/L   Basic metabolic panel   Result Value Ref Range    Sodium 136 133 - 144 mmol/L    Potassium 3.3 (L) 3.4 - 5.3 mmol/L    Chloride 100 94 - 109 mmol/L    Carbon Dioxide (CO2) 25 20 - 32 mmol/L    Anion Gap 11 3 - 14 mmol/L    Urea Nitrogen 14 7 - 30 mg/dL     Creatinine 0.91 0.66 - 1.25 mg/dL    Calcium 9.2 8.5 - 10.1 mg/dL    Glucose 84 70 - 99 mg/dL    GFR Estimate >90 >60 mL/min/1.73m2   CBC with platelets differential    Narrative    The following orders were created for panel order CBC with platelets differential.  Procedure                               Abnormality         Status                     ---------                               -----------         ------                     CBC with platelets and d...[[id number]]                      Final result                 Please view results for these tests on the individual orders.   CBC with platelets and differential   Result Value Ref Range    WBC Count 5.6 4.0 - 11.0 10e3/uL    RBC Count 5.23 4.40 - 5.90 10e6/uL    Hemoglobin 15.3 13.3 - 17.7 g/dL    Hematocrit 43.9 40.0 - 53.0 %    MCV 84 78 - 100 fL    MCH 29.3 26.5 - 33.0 pg    MCHC 34.9 31.5 - 36.5 g/dL    RDW 11.7 10.0 - 15.0 %    Platelet Count 284 150 - 450 10e3/uL    % Neutrophils 57 %    % Lymphocytes 23 %    % Monocytes 20 %    % Eosinophils 0 %    % Basophils 0 %    % Immature Granulocytes 0 %    NRBCs per 100 WBC 0 <1 /100    Absolute Neutrophils 3.2 1.6 - 8.3 10e3/uL    Absolute Lymphocytes 1.3 0.8 - 5.3 10e3/uL    Absolute Monocytes 1.1 0.0 - 1.3 10e3/uL    Absolute Eosinophils 0.0 0.0 - 0.7 10e3/uL    Absolute Basophils 0.0 0.0 - 0.2 10e3/uL    Absolute Immature Granulocytes 0.0 <=0.4 10e3/uL    Absolute NRBCs 0.0 10e3/uL   XR Chest 2 Views    Narrative    EXAM: XR CHEST 2 VIEWS  LOCATION: [facility]  DATE/TIME: 11/28/2022 7:17 PM    INDICATION: SOB  COMPARISON: None.      Impression    IMPRESSION: Heart size and pulmonary vascularity normal. The lungs are clear. Clips upper abdomen.   BNP   Result Value Ref Range    N terminal Pro BNP Inpatient 21 0 - 450 pg/mL   D dimer quantitative   Result Value Ref Range    D-Dimer Quantitative <0.27 0.00 - 0.50 ug/mL FEU    Narrative    This D-dimer assay is  intended for use in conjunction with a clinical pretest probability assessment model to exclude pulmonary embolism (PE) and deep venous thrombosis (DVT) in outpatients suspected of PE or DVT. The cut-off value is 0.50 ug/mL FEU.   Troponin I   Result Value Ref Range    Troponin I High Sensitivity 6 <79 ng/L     Medications   0.9% sodium chloride BOLUS (0 mLs Intravenous Stopped 11/28/22 1911)     Followed by   sodium chloride 0.9% infusion ( Intravenous New Bag 11/28/22 2100)   ondansetron (ZOFRAN) injection 4 mg (4 mg Intravenous Given 11/28/22 1804)   acetaminophen (TYLENOL) tablet 1,000 mg (1,000 mg Oral Given 11/28/22 2042)   ibuprofen (ADVIL/MOTRIN) tablet 600 mg (600 mg Oral Given 11/28/22 2042)             Assessments & Plan (with Medical Decision Making)   Aron 26-year-old male who presents with viral URI symptoms.    On exam he appears that he feels unwell although is nontoxic-appearing, tachycardic otherwise stable vital signs including normal oxygen saturation.  Lung exam is clear.    Differential include things like influenza, COVID, RSV, would also consider things like postviral pneumonia or myocarditis   Given the chest pain and shortness of breath complaints.    Initial evaluation will include EKG, labs, chest x-ray.    Patient's labs are unremarkable including normal white count, no left shift. what happened mildly low potassium we will encourage him to increase his oral potassium intake.  Influenza positive.    Patient's EKG shows diffuse ST depressions, normal troponin.  Discussed the case with cardiology staff, Dr. Borjas who reviewed case and EKG. They recommended doing a second troponin.  Felt that if D-dimer, delta troponin, and BNP are all negative then she is not concerned about the ST changes on the EKG. Specifically she did think he required admission for observation and ECHO.    Patient's delta troponin was normal.  BNP normal and D-dimer is also normal.  Discussed the case again with Dr. Borjas  and she felt the patient was safe to discharge.  On reassessment confirmed the patient he was not having chest pain, he was resting comfortably reclined, had normal O2 sats.  Discussed if he develop chest pain, worsening shortness of breath, any hypoxia or lower extremity swelling he needs to return to the ER.    Patient's chest x-ray showed no evidence of pneumonia, normal pulmonary vascularity.    Discussed with patient.  He understands discharge and return precautions.  Discussed needs for symptomatic management, will also prescribe Tamiflu given his underlying history of asthma.  We will encourage that he continue to use his albuterol inhaler.  Will not do steroids as he had no wheezing on exam.    I have reviewed the nursing notes.    I have reviewed the findings, diagnosis, plan and need for follow up with the patient.    New Prescriptions    BENZONATATE (TESSALON) 100 MG CAPSULE    Take 1 capsule (100 mg) by mouth 3 times daily as needed for cough    OSELTAMIVIR (TAMIFLU) 75 MG CAPSULE    Take 1 capsule (75 mg) by mouth 2 times daily for 5 days       Final diagnoses:   Influenza A     IHarish, am serving as a trained medical scribe to document services personally performed by Zay Sultana MD, based on the provider's statements to me.      IZay MD, was physically present and have reviewed and verified the accuracy of this note documented by Harish Pittman.     11/28/2022   [facility] EMERGENCY DEPARTMENT     Zay Sultana MD  11/28/22 1505

## 2022-11-29 NOTE — DISCHARGE INSTRUCTIONS
Return to the emergency department if you develop chest pain, worsening shortness of breath or any new or unusual symptoms.    Take Tessalon Perles and Tamiflu as prescribed.    Follow-up with your primary care doctor from this ER visit; primary care can decide if further cardiac testing such as an echo is needed for EKG changes.      Your potassium was low, please eat high potassium foods such as bananas, avocados, milk, orange juice, potatoes, tomato juice, etc.

## 2022-11-30 ENCOUNTER — PRE VISIT (OUTPATIENT)
Dept: CARDIOLOGY | Facility: CLINIC | Age: 27
End: 2022-11-30

## 2022-11-30 ENCOUNTER — OFFICE VISIT (OUTPATIENT)
Dept: CARDIOLOGY | Facility: CLINIC | Age: 27
End: 2022-11-30
Attending: INTERNAL MEDICINE
Payer: COMMERCIAL

## 2022-11-30 VITALS
WEIGHT: 154.8 LBS | OXYGEN SATURATION: 96 % | HEART RATE: 91 BPM | HEIGHT: 63 IN | DIASTOLIC BLOOD PRESSURE: 84 MMHG | SYSTOLIC BLOOD PRESSURE: 123 MMHG | BODY MASS INDEX: 27.43 KG/M2

## 2022-11-30 DIAGNOSIS — I48.0 PAF (PAROXYSMAL ATRIAL FIBRILLATION) (H): Primary | ICD-10-CM

## 2022-11-30 DIAGNOSIS — Z86.79 HISTORY OF ATRIAL FIBRILLATION: ICD-10-CM

## 2022-11-30 PROCEDURE — G0463 HOSPITAL OUTPT CLINIC VISIT: HCPCS | Mod: 25

## 2022-11-30 PROCEDURE — 93005 ELECTROCARDIOGRAM TRACING: CPT

## 2022-11-30 PROCEDURE — 99204 OFFICE O/P NEW MOD 45 MIN: CPT | Performed by: INTERNAL MEDICINE

## 2022-11-30 ASSESSMENT — PAIN SCALES - GENERAL: PAINLEVEL: NO PAIN (0)

## 2022-11-30 NOTE — LETTER
2022      RE: Aron Barbosa  [address] [address]       Dear Colleague,    Thank you for the opportunity to participate in the care of your patient, Aron Barbosa, at the [facility] [place] at [facility]. Please see a copy of my visit note below.        ELECTROPHYSIOLOGY CLINIC VISIT    Assessment/Recommendations   Assessment/Plan:    Mr. Barbosa is a 26 year old male who has a past medical history significant for WPW, AF s/p ablation 2021, POTS, family history of SCD, migraines, IBS, and ADD.     Paroxsymal Atrial Fibrillation:   1. Stroke Prophylaxis:  CHADSVASC=  0, corresponding to a 0.2-0.5% annual stroke / systemic emolism event rate. Indicating no need for long term oral anticoagulation.    2. Rate Control: Continue Metoprolol.   3. Rhythm Control: He had been on PRN and then daily Flecainide but continued to have PAF. He had  Laser balloon PVI in 2021 at OSH. He has not had any known breakthrough AF since. He still has flecainide PRN now but has not had to take since ablation.  4. Risk Factor Management: BP control, and BEATRIZ evaluation as indicated.         POTS:  1. Continue Midodrine 5 mg TID.   2. Will try to come off Ivabradine. Will decrease to 5 mg daily for 2 weeks and then stop. Will make this change in 1 month to allow recovery from flu.   3. Use compression shorts (athletic compression shorts)  4. Increase hydration with goal to take in 72 oz of water/electrolyte fluids per day.   5. Liberalize salt intake  6. Change positions carefully and slowly and maintain safe environment.  7. Standing training and supine isometric exercises.     Family History of SCD:  1. Brother  suddenly in sleep in 30s. Autopsy reported enlarged heart.  2. Pt had CMR and genetic testing at OSH that we will get images/reports to review here.     Follow up in 1 year or sooner if need ifeanyi.        History of Present  Illness/Subjective    Mr. Aron Barbosa is a 26 year old male who comes in today for EP consultation of AF, WPW, POTS.    Mr. Barbosa is a 26 year old male who has a past medical history significant for WPW, AF s/p ablation 2021, POTS, family history of SCD, migraines, IBS, and ADD.     He has a cardiac history of short NH/WPW for which he had a negative EPS  which was reported normal with short HV and no preexcitation or induction of arrhythmia. During fast atrial pacing, he had RBBB. No ablation was performed. He also has reported AF for which he has been on PRN Flecainide. He had been followed by EP/Cards at OSH prior to moving to [place]. At visit there in 2021, he brought tracings from [facility] that showed a WCT (tracing below). He had associated symptoms of SOB, chest pain, chest fluttering, and lightheadedness with 1 syncopal event. These symptoms had occurred several times a week at most or sometimes a couple times a month. He had also had diagnosis of POTS which he felt had been well controlled on midodrine and ivabradine. He was placed on daily Flecainide but continued to have breakthrough AF. He had a laser balloon PVI in 2021. He had a brother that  suddenly in his 30s. He was a heavy drinker and smoker, and  in his sleep with autopsy reporting enlarged heart. Patient had genetic testing at OSH in 2021 that reportedly showed  2 variants with uncertain significance. We do not have these records immediately available for our review. He had been offered an ILR before but preferred just to use his iWatch. He had  CMR that was reportedly normal, we will work to get images for our review as well. He has not had any sustained AF since ablation. He is back to PRN Flecainide.       He presents today to establish care after moving to [place]. He reports feeling well until recently when he got the flu. He had ED visit for SOB two days ago. His fevers have cleared but he still feels SOB. He denies chest  "discomfort, palpitations, abdominal fullness/bloating or peripheral edema, paroxysmal nocturnal dyspnea, orthopnea, lightheadedness, dizziness, pre-syncope, or syncope. Presenting 12 lead ECG shows NSR Vent Rate 81 bpm,  ms, QRS 96 ms, QTc 453 ms. Current cardiac medications include: Ivabradine, Midodrine, PRN Flecainide, and Metoprolol.       I have reviewed and updated the patient's Past Medical History, Social History, Family History and Medication List.     Cardiographics (Personally Reviewed) :   2017 Echo (OSH Report):       8/13/21 CMR (OSH Report):  IMPRESSION:     1. No MRI evidence of ARVC/D.   2. Except for abnormal low extracellular volume at 19% (which may be   unreliable), the rest of the cardiac MRI is normal. There is no evidence of   enhancing scar. There is no evidence of myocardial edema.     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    iWatch Tracing:         Physical Examination   /84 (BP Location: Right arm, Patient Position: Chair, Cuff Size: Adult Regular)   Pulse 91   Ht 1.6 m (5' 2.99\")   Wt 70.2 kg (154 lb 12.8 oz)   SpO2 96%   BMI 27.43 kg/m    Wt Readings from Last 3 Encounters:   11/28/22 70.3 kg (155 lb)   11/01/22 72.1 kg (159 lb)   10/05/22 70.3 kg (155 lb)     General Appearance:   Alert, well-appearing and in no acute distress.   HEENT: Atraumatic, normocephalic. PERRL.  MMM.   Chest/Lungs:   Respirations unlabored.  Lungs are clear to auscultation.   Cardiovascular:   Regular rate and rhythm.  S1/S2. No murmur.    Abdomen:  Soft, nontender, nondistended.   Extremities: No cyanosis or clubbing. No edema.    Musculoskeletal: Moves all extremities.  Gait normal. "   Skin: Warm, dry, intact.    Neurologic: Mood and affect are appropriate.  Alert and oriented to person, place, time, and situation.          Medications  Allergies   Current Outpatient Medications   Medication Sig Dispense Refill     acetaminophen (TYLENOL) 500 MG tablet Take 1,000 mg by mouth       albuterol (PROAIR HFA/PROVENTIL HFA/VENTOLIN HFA) 108 (90 Base) MCG/ACT inhaler Inhale 2 puffs into the lungs every 6 hours 18 g 3     azelastine (ASTELIN) 0.1 % nasal spray Spray 1 spray into both nostrils 2 times daily 30 mL 0     benzonatate (TESSALON) 100 MG capsule Take 1 capsule (100 mg) by mouth 3 times daily as needed for cough 30 capsule 0     colestipol (COLESTID) 1 g tablet Take 1 tablet (1 g) by mouth 2 times daily 60 tablet 3     cyproheptadine (PERIACTIN) 4 MG tablet Take 1 tablet by mouth       diphenhydrAMINE (BENADRYL) 25 MG capsule Take 1 capsule by mouth       eletriptan (RELPAX) 20 MG tablet TAKE ONE TAB AT ONSET OF HEADACHE. MAY REPEAT IN 2HRS X 1 AS NEEDED. MAX 2 TABS IN 24 HRS.       eptinezumab-jjmr (VYEPTI) 100 MG/ML Inject 100 mg into the vein       flecainide (TAMBOCOR) 100 MG tablet Take 100 mg by mouth       guaiFENesin (MUCINEX) 600 MG 12 hr tablet Take 1,200 mg by mouth       hyoscyamine SL (LEVSIN/SL) 0.125 MG sublingual tablet Place 0.125 mg under the tongue       ibuprofen (ADVIL/MOTRIN) 200 MG capsule Take 200 mg by mouth every 4 hours as needed for fever       ivabradine (CORLANOR) 5 MG tablet Take 5 mg by mouth 2 times daily       lidocaine, viscous, (XYLOCAINE) 2 % solution Take 15 mLs by mouth       loperamide (IMODIUM A-D) 2 MG tablet Take 2 mg by mouth       Magnesium Oxide 250 MG TABS ONE BY MOUTH TWICE DAILY TAKE WITH FOOD.       metoprolol tartrate (LOPRESSOR) 25 MG tablet 25 mg qAM and 12.5 mg qPM       midodrine (PROAMATINE) 5 MG tablet Take 5 mg by mouth       mirtazapine (REMERON) 15 MG tablet Take 7.5 mg by mouth       ondansetron (ZOFRAN ODT) 4 MG ODT tab Take 1 tablet  (4 mg) by mouth every 8 hours as needed for nausea 20 tablet 0     oseltamivir (TAMIFLU) 75 MG capsule Take 1 capsule (75 mg) by mouth 2 times daily for 5 days 10 capsule 0     pantoprazole (PROTONIX) 40 MG EC tablet Take 40 mg by mouth 2 times daily       promethazine (PHENERGAN) 12.5 MG tablet Take 1 tablet by mouth       Riboflavin 400 MG TABS Take 400 mg by mouth      Allergies   Allergen Reactions     Metoclopramide Anxiety, Nausea and Vomiting and Other (See Comments)     Other reaction(s): Anxiety, GI Upset, Nausea And Vomiting, OTHER (explain in comments), Other (See Comments)  Other reaction(s): ANXIETY, GI UPSET  Other reaction(s): OTHER (explain in comments)  Other reaction(s): ANXIETY, GI UPSET  Other reaction(s): GI Upset, Other (See Comments)  Other reaction(s): ANXIETY, GI UPSET  Other reaction(s): OTHER (explain in comments)  Other reaction(s): ANXIETY, GI UPSET  Other reaction(s): ANXIETY, GI UPSET    Other reaction(s): OTHER (explain in comments)  Other reaction(s): ANXIETY, GI UPSET    Other reaction(s): ANXIETY, GI UPSET  Other reaction(s): OTHER (explain in comments)  Other reaction(s): ANXIETY, GI UPSET  Other reaction(s): GI Upset, Other (See Comments)  Other reaction(s): ANXIETY, GI UPSET  Other reaction(s): OTHER (explain in comments)  Other reaction(s): ANXIETY, GI UPSET  Other reaction(s): ANXIETY, GI UPSET  Other reaction(s): ANXIETY, GI UPSET  Other reaction(s): OTHER (explain in comments)  Other reaction(s): ANXIETY, GI UPSET  Other reaction(s): ANXIETY, GI UPSET  Other reaction(s): ANXIETY, GI UPSET       Nicotine Shortness Of Breath     Nicotiana Tabacum      Other reaction(s): OTHER (explain in comments)  Allergic reaction to exposure to vaping - respiratory distress and wheezing     Smoke.      Other reaction(s): Other (See Comments)  Allergic reaction to exposure to vaping - respiratory distress and wheezing  Allergic reaction to exposure to vaping - respiratory distress and  wheezing     Amoxicillin Rash     Other reaction(s): Rash, SKIN - rash  Other reaction(s): RASH  Other reaction(s): RASH  Other reaction(s): SKIN - rash  Other reaction(s): RASH  Other reaction(s): RASH  Other reaction(s): RASH  Other reaction(s): RASH    Other reaction(s): RASH  Rash as a toddler  Rash as a toddler    Other reaction(s): SKIN - rash  Other reaction(s): RASH    Other reaction(s): RASH  Rash as a toddler  Other reaction(s): RASH  Other reaction(s): RASH  Other reaction(s): SKIN - rash  Other reaction(s): RASH  Other reaction(s): RASH  Other reaction(s): RASH  Other reaction(s): RASH    Other reaction(s): RASH  Rash as a toddler  Rash as a toddler  Other reaction(s): RASH  Other reaction(s): RASH  Other reaction(s): RASH  Tolerates cephalosporins  Rash as a toddler       Prochlorperazine Anxiety         Lab Results (Personally Reviewed)    Chemistry/lipid CBC Cardiac Enzymes/BNP/TSH/INR   Lab Results   Component Value Date    BUN 14 11/28/2022     11/28/2022    CO2 25 11/28/2022     Creatinine   Date Value Ref Range Status   11/28/2022 0.91 0.66 - 1.25 mg/dL Final       No results found for: CHOL, HDL, LDL, CHOLHDL   Lab Results   Component Value Date    WBC 5.6 11/28/2022    HGB 15.3 11/28/2022    HCT 43.9 11/28/2022    MCV 84 11/28/2022     11/28/2022    No results found for: CKTOTAL, CKMB, TROPONINI, BNP, TSH, INR     The patient states understanding and is agreeable with the plan.   Dustin Hollins MD [facility]  Cardiology - Electrophysiology    Total time spent on patient visit, reviewing notes, imaging, labs, orders, and completing necessary documentation: 45 minutes.

## 2022-11-30 NOTE — NURSING NOTE
Chief Complaint   Patient presents with     New Patient     Establish care for paroxysmal atrial fib - on flecainide, POTS. PVI 11/8/21 by Dr Parsons at [facility] in [place].        Vitals were taken, medications reconciled, and EKG performed.    Serafin Lantigua  3:02 PM

## 2022-11-30 NOTE — PATIENT INSTRUCTIONS
Plan:   In a month, you can cut your ivabradine to daily for a few days, then stop  We will request cardiac MRI images from [place]  Follow-up in 1 year       Your Care Team:  EP Cardiology   Telephone Number     Rashmi Jones RN [phone]    After business hours: [phone], ask for cardiologist on-call   Non-procedure scheduling:    Valencia JOHNS   [phone]   Procedure scheduling:    Carina Mcginnis   [phone]   Device Clinic (Pacemakers, ICDs, Loop Recorders)    During business hours: [phone]  After business hours:   [phone]- select option 4 and ask for job code [id number].       Cardiovascular Clinic:   [address] [place] [address]      As always, Thank you for trusting us with your health care needs!

## 2022-11-30 NOTE — PROGRESS NOTES
ELECTROPHYSIOLOGY CLINIC VISIT    Assessment/Recommendations   Assessment/Plan:    Mr. Barbosa is a 26 year old male who has a past medical history significant for WPW, AF s/p ablation 2021, POTS, family history of SCD, migraines, IBS, and ADD.     Paroxsymal Atrial Fibrillation:   1. Stroke Prophylaxis:  CHADSVASC=  0, corresponding to a 0.2-0.5% annual stroke / systemic emolism event rate. Indicating no need for long term oral anticoagulation.    2. Rate Control: Continue Metoprolol.   3. Rhythm Control: He had been on PRN and then daily Flecainide but continued to have PAF. He had  Laser balloon PVI in 2021 at OSH. He has not had any known breakthrough AF since. He still has flecainide PRN now but has not had to take since ablation.  4. Risk Factor Management: BP control, and BEATRIZ evaluation as indicated.         POTS:  1. Continue Midodrine 5 mg TID.   2. Will try to come off Ivabradine. Will decrease to 5 mg daily for 2 weeks and then stop. Will make this change in 1 month to allow recovery from flu.   3. Use compression shorts (athletic compression shorts)  4. Increase hydration with goal to take in 72 oz of water/electrolyte fluids per day.   5. Liberalize salt intake  6. Change positions carefully and slowly and maintain safe environment.  7. Standing training and supine isometric exercises.     Family History of SCD:  1. Brother  suddenly in sleep in 30s. Autopsy reported enlarged heart.  2. Pt had CMR and genetic testing at OSH that we will get images/reports to review here.     Follow up in 1 year or sooner if need ifeanyi.        History of Present Illness/Subjective    Mr. Aron Barbosa is a 26 year old male who comes in today for EP consultation of AF, WPW, POTS.    Mr. Barbosa is a 26 year old male who has a past medical history significant for WPW, AF s/p ablation 2021, POTS, family history of SCD, migraines, IBS, and ADD.     He has a cardiac history of short NC/WPW for which he  had a negative EPS  which was reported normal with short HV and no preexcitation or induction of arrhythmia. During fast atrial pacing, he had RBBB. No ablation was performed. He also has reported AF for which he has been on PRN Flecainide. He had been followed by EP/Cards at OSH prior to moving to [place]. At visit [facility] in 2021[facility]). He had associated symptoms of SOB, chest pain, chest fluttering, and lightheadedness with 1 syncopal event. These symptoms had occurred several times a week at most or sometimes a couple times a month. He had also had diagnosis of POTS which he felt had been well controlled on midodrine and ivabradine. He was placed on daily Flecainide but continued to have breakthrough AF. He had a laser balloon PVI in 2021. He had a brother that  suddenly in his 30s. He was a heavy drinker and smoker, and  in his sleep with autopsy reporting enlarged heart. Patient had genetic testing at OSH in 2021 that reportedly showed  2 variants with uncertain significance. We do not have these records immediately available for our review. He had been offered an ILR before but preferred just to use his iWatch. He had  CMR that was reportedly normal, we will work to get images for our review as well. He has not had any sustained AF since ablation. He is back to PRN Flecainide.       He presents today to establish care after moving to [place]. He reports feeling well until recently when he got the flu. He had ED visit for SOB two days ago. His fevers have cleared but he still feels SOB. He denies chest discomfort, palpitations, abdominal fullness/bloating or peripheral edema, paroxysmal nocturnal dyspnea, orthopnea, lightheadedness, dizziness, pre-syncope, or syncope. Presenting 12 lead ECG shows NSR Vent Rate 81 bpm,  ms, QRS 96 ms, QTc 453 ms. Current cardiac medications include: Ivabradine, Midodrine, PRN Flecainide, and Metoprolol.  "      I have reviewed and updated the patient's Past Medical History, Social History, Family History and Medication List.     Cardiographics (Personally Reviewed) :   2017 Echo (OSH Report):       8/13/21 CMR (OSH Report):  IMPRESSION:     1. No MRI evidence of ARVC/D.   2. Except for abnormal low extracellular volume at 19% (which may be   unreliable), the rest of the cardiac MRI is normal. There is no evidence of   enhancing scar. There is no evidence of myocardial edema.     LVEF: 60%   RVEF: 47%   Background ECV: 19.0%     Genetic Testing (OSH):  patient underwent genetic testing through [employer]'s Detect Cardiomyopathy and Arrhythmia panel, which revealed 2 variants of unknown significance (GAA c.2381G>C (p.Mrm584Ked) and NEBL c.311G>A (p.Uid581Uid)) and 1 benign pseudodeficiency allele, GAA c.271G>A (p.Pud70Qza).    iWatch Tracing:         Physical Examination   /84 (BP Location: Right arm, Patient Position: Chair, Cuff Size: Adult Regular)   Pulse 91   Ht 1.6 m (5' 2.99\")   Wt 70.2 kg (154 lb 12.8 oz)   SpO2 96%   BMI 27.43 kg/m    Wt Readings from Last 3 Encounters:   11/28/22 70.3 kg (155 lb)   11/01/22 72.1 kg (159 lb)   10/05/22 70.3 kg (155 lb)     General Appearance:   Alert, well-appearing and in no acute distress.   HEENT: Atraumatic, normocephalic. PERRL.  MMM.   Chest/Lungs:   Respirations unlabored.  Lungs are clear to auscultation.   Cardiovascular:   Regular rate and rhythm.  S1/S2. No murmur.    Abdomen:  Soft, nontender, nondistended.   Extremities: No cyanosis or clubbing. No edema.    Musculoskeletal: Moves all extremities.  Gait normal.   Skin: Warm, dry, intact.    Neurologic: Mood and affect are appropriate.  Alert and oriented to person, place, time, and situation.          Medications  Allergies   Current Outpatient Medications   Medication Sig Dispense Refill     acetaminophen (TYLENOL) 500 MG tablet Take 1,000 mg by mouth       albuterol (PROAIR HFA/PROVENTIL HFA/VENTOLIN " HFA) 108 (90 Base) MCG/ACT inhaler Inhale 2 puffs into the lungs every 6 hours 18 g 3     azelastine (ASTELIN) 0.1 % nasal spray Spray 1 spray into both nostrils 2 times daily 30 mL 0     benzonatate (TESSALON) 100 MG capsule Take 1 capsule (100 mg) by mouth 3 times daily as needed for cough 30 capsule 0     colestipol (COLESTID) 1 g tablet Take 1 tablet (1 g) by mouth 2 times daily 60 tablet 3     cyproheptadine (PERIACTIN) 4 MG tablet Take 1 tablet by mouth       diphenhydrAMINE (BENADRYL) 25 MG capsule Take 1 capsule by mouth       eletriptan (RELPAX) 20 MG tablet TAKE ONE TAB AT ONSET OF HEADACHE. MAY REPEAT IN 2HRS X 1 AS NEEDED. MAX 2 TABS IN 24 HRS.       eptinezumab-jjmr (VYEPTI) 100 MG/ML Inject 100 mg into the vein       flecainide (TAMBOCOR) 100 MG tablet Take 100 mg by mouth       guaiFENesin (MUCINEX) 600 MG 12 hr tablet Take 1,200 mg by mouth       hyoscyamine SL (LEVSIN/SL) 0.125 MG sublingual tablet Place 0.125 mg under the tongue       ibuprofen (ADVIL/MOTRIN) 200 MG capsule Take 200 mg by mouth every 4 hours as needed for fever       ivabradine (CORLANOR) 5 MG tablet Take 5 mg by mouth 2 times daily       lidocaine, viscous, (XYLOCAINE) 2 % solution Take 15 mLs by mouth       loperamide (IMODIUM A-D) 2 MG tablet Take 2 mg by mouth       Magnesium Oxide 250 MG TABS ONE BY MOUTH TWICE DAILY TAKE WITH FOOD.       metoprolol tartrate (LOPRESSOR) 25 MG tablet 25 mg qAM and 12.5 mg qPM       midodrine (PROAMATINE) 5 MG tablet Take 5 mg by mouth       mirtazapine (REMERON) 15 MG tablet Take 7.5 mg by mouth       ondansetron (ZOFRAN ODT) 4 MG ODT tab Take 1 tablet (4 mg) by mouth every 8 hours as needed for nausea 20 tablet 0     oseltamivir (TAMIFLU) 75 MG capsule Take 1 capsule (75 mg) by mouth 2 times daily for 5 days 10 capsule 0     pantoprazole (PROTONIX) 40 MG EC tablet Take 40 mg by mouth 2 times daily       promethazine (PHENERGAN) 12.5 MG tablet Take 1 tablet by mouth       Riboflavin 400 MG TABS  Take 400 mg by mouth      Allergies   Allergen Reactions     Metoclopramide Anxiety, Nausea and Vomiting and Other (See Comments)     Other reaction(s): Anxiety, GI Upset, Nausea And Vomiting, OTHER (explain in comments), Other (See Comments)  Other reaction(s): ANXIETY, GI UPSET  Other reaction(s): OTHER (explain in comments)  Other reaction(s): ANXIETY, GI UPSET  Other reaction(s): GI Upset, Other (See Comments)  Other reaction(s): ANXIETY, GI UPSET  Other reaction(s): OTHER (explain in comments)  Other reaction(s): ANXIETY, GI UPSET  Other reaction(s): ANXIETY, GI UPSET    Other reaction(s): OTHER (explain in comments)  Other reaction(s): ANXIETY, GI UPSET    Other reaction(s): ANXIETY, GI UPSET  Other reaction(s): OTHER (explain in comments)  Other reaction(s): ANXIETY, GI UPSET  Other reaction(s): GI Upset, Other (See Comments)  Other reaction(s): ANXIETY, GI UPSET  Other reaction(s): OTHER (explain in comments)  Other reaction(s): ANXIETY, GI UPSET  Other reaction(s): ANXIETY, GI UPSET  Other reaction(s): ANXIETY, GI UPSET  Other reaction(s): OTHER (explain in comments)  Other reaction(s): ANXIETY, GI UPSET  Other reaction(s): ANXIETY, GI UPSET  Other reaction(s): ANXIETY, GI UPSET       Nicotine Shortness Of Breath     Nicotiana Tabacum      Other reaction(s): OTHER (explain in comments)  Allergic reaction to exposure to vaping - respiratory distress and wheezing     Smoke.      Other reaction(s): Other (See Comments)  Allergic reaction to exposure to vaping - respiratory distress and wheezing  Allergic reaction to exposure to vaping - respiratory distress and wheezing     Amoxicillin Rash     Other reaction(s): Rash, SKIN - rash  Other reaction(s): RASH  Other reaction(s): RASH  Other reaction(s): SKIN - rash  Other reaction(s): RASH  Other reaction(s): RASH  Other reaction(s): RASH  Other reaction(s): RASH    Other reaction(s): RASH  Rash as a toddler  Rash as a toddler    Other reaction(s): SKIN -  rash  Other reaction(s): RASH    Other reaction(s): RASH  Rash as a toddler  Other reaction(s): RASH  Other reaction(s): RASH  Other reaction(s): SKIN - rash  Other reaction(s): RASH  Other reaction(s): RASH  Other reaction(s): RASH  Other reaction(s): RASH    Other reaction(s): RASH  Rash as a toddler  Rash as a toddler  Other reaction(s): RASH  Other reaction(s): RASH  Other reaction(s): RASH  Tolerates cephalosporins  Rash as a toddler       Prochlorperazine Anxiety         Lab Results (Personally Reviewed)    Chemistry/lipid CBC Cardiac Enzymes/BNP/TSH/INR   Lab Results   Component Value Date    BUN 14 11/28/2022     11/28/2022    CO2 25 11/28/2022     Creatinine   Date Value Ref Range Status   11/28/2022 0.91 0.66 - 1.25 mg/dL Final       No results found for: CHOL, HDL, LDL, CHOLHDL   Lab Results   Component Value Date    WBC 5.6 11/28/2022    HGB 15.3 11/28/2022    HCT 43.9 11/28/2022    MCV 84 11/28/2022     11/28/2022    No results found for: CKTOTAL, CKMB, TROPONINI, BNP, TSH, INR     The patient states understanding and is agreeable with the plan.   Dustin Hollins MD [facility]  Cardiology - Electrophysiology    Total time spent on patient visit, reviewing notes, imaging, labs, orders, and completing necessary documentation: 45 minutes.

## 2022-12-01 ENCOUNTER — HOSPITAL ENCOUNTER (EMERGENCY)
Facility: CLINIC | Age: 27
Discharge: HOME | End: 2022-12-01
Attending: EMERGENCY MEDICINE | Admitting: EMERGENCY MEDICINE
Payer: COMMERCIAL

## 2022-12-01 VITALS
TEMPERATURE: 98.7 F | HEIGHT: 64 IN | SYSTOLIC BLOOD PRESSURE: 123 MMHG | HEART RATE: 100 BPM | OXYGEN SATURATION: 99 % | BODY MASS INDEX: 26.42 KG/M2 | RESPIRATION RATE: 18 BRPM | DIASTOLIC BLOOD PRESSURE: 88 MMHG | WEIGHT: 154.76 LBS

## 2022-12-01 DIAGNOSIS — J10.1 INFLUENZA A: ICD-10-CM

## 2022-12-01 LAB
ATRIAL RATE - MUSE: 81 BPM
DIASTOLIC BLOOD PRESSURE - MUSE: NORMAL MMHG
INTERPRETATION ECG - MUSE: NORMAL
P AXIS - MUSE: 33 DEGREES
PR INTERVAL - MUSE: 112 MS
QRS DURATION - MUSE: 96 MS
QT - MUSE: 390 MS
QTC - MUSE: 453 MS
R AXIS - MUSE: 19 DEGREES
SYSTOLIC BLOOD PRESSURE - MUSE: NORMAL MMHG
T AXIS - MUSE: -1 DEGREES
VENTRICULAR RATE- MUSE: 81 BPM

## 2022-12-01 PROCEDURE — 99283 EMERGENCY DEPT VISIT LOW MDM: CPT | Performed by: EMERGENCY MEDICINE

## 2022-12-01 ASSESSMENT — ENCOUNTER SYMPTOMS
COUGH: 1
SHORTNESS OF BREATH: 0

## 2022-12-01 NOTE — ED TRIAGE NOTES
Pt presents with a several day history of chest pain that began when he was diagnosed with influenza a.  Was seen by cardiology yesterday and no concerns were noted at that time.  Pt states he was told to present to the ED if chest pain worsened.     Triage Assessment     Row Name 12/01/22 1729       Triage Assessment (Adult)    Airway WDL WDL       Respiratory WDL    Respiratory WDL WDL       Skin Circulation/Temperature WDL    Skin Circulation/Temperature WDL WDL       Cardiac WDL    Cardiac WDL WDL       Peripheral/Neurovascular WDL    Peripheral Neurovascular WDL WDL       Cognitive/Neuro/Behavioral WDL    Cognitive/Neuro/Behavioral WDL WDL

## 2022-12-01 NOTE — LETTER
December 1, 2022      To Whom It May Concern:      Aron Barbosa was seen in our Emergency Department today, 12/01/22.  I expect his condition to improve over the next few days.  He may return to school on 12/5/2022    Sincerely,        Dhruv Dolan MD

## 2022-12-01 NOTE — ED PROVIDER NOTES
[place] EMERGENCY DEPARTMENT ([facility])    12/01/22    TRIAGE A  12:30 PM   ED PROVIDER NOTE      History     Chief Complaint   Patient presents with     Chest Pain     The history is provided by the patient and medical records.     Aron Barbosa is a 26 year old male with history of WPW s/p ablation, POTS who presents with persistent flu symptoms as well as chest pain. He was here on Monday (11/28/22) reporting shortness of breath, chest pain, cough and fever. He was seen by Dr. Sultana who performed workup with labs, EKG, was diagnosed with influenza. He also had an EKG with some T wave and ST changes in the lateral leads. He had delta troponins done as well as Cardiology consult, felt he was safe to be discharged. He had follow-up with Cardiology clinic yesterday with Dr. Hollins for paroxysmal afib. Today he developed chest pain and came back to the Emergency Department given his past history. He has been taking Tylenol and ibuprofen for symptoms. He would like a school note as well, did not go to classes today. He does not have classes over the weekend. He is on metoprolol, metoprolol, and Corlanor.      Past Medical History  Past Medical History:   Diagnosis Date     Gastroesophageal reflux disease      Heart disease      Uncomplicated asthma      Past Surgical History:   Procedure Laterality Date     APPENDECTOMY       CHOLECYSTECTOMY       tonsilctomy       acetaminophen (TYLENOL) 500 MG tablet  albuterol (PROAIR HFA/PROVENTIL HFA/VENTOLIN HFA) 108 (90 Base) MCG/ACT inhaler  azelastine (ASTELIN) 0.1 % nasal spray  benzonatate (TESSALON) 100 MG capsule  colestipol (COLESTID) 1 g tablet  cyproheptadine (PERIACTIN) 4 MG tablet  diphenhydrAMINE (BENADRYL) 25 MG capsule  eletriptan (RELPAX) 20 MG tablet  eptinezumab-jjmr (VYEPTI) 100 MG/ML  flecainide (TAMBOCOR) 100 MG tablet  guaiFENesin (MUCINEX) 600 MG 12 hr tablet  hyoscyamine SL (LEVSIN/SL) 0.125 MG sublingual tablet  ibuprofen (ADVIL/MOTRIN)  200 MG capsule  ivabradine (CORLANOR) 5 MG tablet  lidocaine, viscous, (XYLOCAINE) 2 % solution  loperamide (IMODIUM A-D) 2 MG tablet  Magnesium Oxide 250 MG TABS  metoprolol tartrate (LOPRESSOR) 25 MG tablet  midodrine (PROAMATINE) 5 MG tablet  mirtazapine (REMERON) 15 MG tablet  ondansetron (ZOFRAN ODT) 4 MG ODT tab  oseltamivir (TAMIFLU) 75 MG capsule  pantoprazole (PROTONIX) 40 MG EC tablet  promethazine (PHENERGAN) 12.5 MG tablet  Riboflavin 400 MG TABS      Allergies   Allergen Reactions     Metoclopramide Anxiety, Nausea and Vomiting and Other (See Comments)     Other reaction(s): Anxiety, GI Upset, Nausea And Vomiting, OTHER (explain in comments), Other (See Comments)  Other reaction(s): ANXIETY, GI UPSET  Other reaction(s): OTHER (explain in comments)  Other reaction(s): ANXIETY, GI UPSET  Other reaction(s): GI Upset, Other (See Comments)  Other reaction(s): ANXIETY, GI UPSET  Other reaction(s): OTHER (explain in comments)  Other reaction(s): ANXIETY, GI UPSET  Other reaction(s): ANXIETY, GI UPSET    Other reaction(s): OTHER (explain in comments)  Other reaction(s): ANXIETY, GI UPSET    Other reaction(s): ANXIETY, GI UPSET  Other reaction(s): OTHER (explain in comments)  Other reaction(s): ANXIETY, GI UPSET  Other reaction(s): GI Upset, Other (See Comments)  Other reaction(s): ANXIETY, GI UPSET  Other reaction(s): OTHER (explain in comments)  Other reaction(s): ANXIETY, GI UPSET  Other reaction(s): ANXIETY, GI UPSET  Other reaction(s): ANXIETY, GI UPSET  Other reaction(s): OTHER (explain in comments)  Other reaction(s): ANXIETY, GI UPSET  Other reaction(s): ANXIETY, GI UPSET  Other reaction(s): ANXIETY, GI UPSET       Nicotine Shortness Of Breath     Nicotiana Tabacum      Other reaction(s): OTHER (explain in comments)  Allergic reaction to exposure to vaping - respiratory distress and wheezing     Smoke.      Other reaction(s): Other (See Comments)  Allergic reaction to exposure to vaping - respiratory  distress and wheezing  Allergic reaction to exposure to vaping - respiratory distress and wheezing     Amoxicillin Rash     Other reaction(s): Rash, SKIN - rash  Other reaction(s): RASH  Other reaction(s): RASH  Other reaction(s): SKIN - rash  Other reaction(s): RASH  Other reaction(s): RASH  Other reaction(s): RASH  Other reaction(s): RASH    Other reaction(s): RASH  Rash as a toddler  Rash as a toddler    Other reaction(s): SKIN - rash  Other reaction(s): RASH    Other reaction(s): RASH  Rash as a toddler  Other reaction(s): RASH  Other reaction(s): RASH  Other reaction(s): SKIN - rash  Other reaction(s): RASH  Other reaction(s): RASH  Other reaction(s): RASH  Other reaction(s): RASH    Other reaction(s): RASH  Rash as a toddler  Rash as a toddler  Other reaction(s): RASH  Other reaction(s): RASH  Other reaction(s): RASH  Tolerates cephalosporins  Rash as a toddler       Prochlorperazine Anxiety     Family History  Family History   Problem Relation Age of Onset     Uterine Cancer Sister      Asthma Brother      Atrial fibrillation Maternal Grandmother      Asthma Paternal Grandmother      Social History   Social History     Tobacco Use     Smoking status: Never     Smokeless tobacco: Never   Vaping Use     Vaping Use: Never used   Substance Use Topics     Alcohol use: Yes     Comment: occa 1/2 per wk     Drug use: Not Currently      Past medical history, past surgical history, medications, allergies, family history, and social history were reviewed with the patient. No additional pertinent items.       Review of Systems   Respiratory: Positive for cough. Negative for shortness of breath.    Cardiovascular: Positive for chest pain.   All other systems reviewed and are negative.    A complete review of systems was performed with pertinent positives and negatives noted in the HPI, and all other systems negative.    Physical Exam   BP: 123/88  Pulse: 109  Temp: 98.7  F (37.1  C)  Resp: 18  Height: 162.6 cm (5'  "4\")  Weight: 70.2 kg (154 lb 12.2 oz)  SpO2: 98 %  Physical Exam  Vitals and nursing note reviewed.   Constitutional:       General: He is not in acute distress.  Cardiovascular:      Rate and Rhythm: Normal rate and regular rhythm.      Heart sounds: Normal heart sounds.   Pulmonary:      Effort: Pulmonary effort is normal.      Breath sounds: No wheezing or rhonchi.      Comments: No bronchospasm  Neurological:      Mental Status: He is alert and oriented to person, place, and time.   Psychiatric:         Mood and Affect: Mood normal.         Behavior: Behavior normal.         ED Course      Procedures                No results found for any visits on 12/01/22.  Medications - No data to display     Assessments & Plan (with Medical Decision Making)   26-year-old male returns 2 days after diagnosis with influenza with ongoing cough and chest pain.  He is not hypoxic he is not febrile on exam his lungs are clear.  I do not feel that he needs a chest x-ray and recommend continued rest push fluids Tylenol and ibuprofen as directed.  He was given a note for school.    Recommend taking Ibuprofen 600 mg three times a day as well as Tylenol on scheduled basis to help with flu-related pain.     I have reviewed the nursing notes. I have reviewed the findings, diagnosis, plan and need for follow up with the patient.    New Prescriptions    No medications on file       Final diagnoses:   Influenza A     I, Yulia Ravi, am serving as a trained medical scribe to document services personally performed by Dhruv Dolan MD based on the provider's statements to me on December 1, 2022.  This document has been checked and approved by the attending provider.    I, Dhruv Dolan MD, was physically present and have reviewed and verified the accuracy of this note documented by Yulia Ravi, medical scribe.      Dhruv Dolan MD       [facility] EMERGENCY DEPARTMENT  12/1/2022   "   Dhruv Dolan MD  12/01/22 2012

## 2023-01-03 ENCOUNTER — TELEPHONE (OUTPATIENT)
Dept: CARDIOLOGY | Facility: CLINIC | Age: 28
End: 2023-01-03

## 2023-01-03 NOTE — TELEPHONE ENCOUNTER
Multiple attempts have been made to reach the patient. The 1st one he was with friends and wanted to call back . Since that time, he hasn't picked up the phone or responded to the [employer] messages. A letter has been mailed and we will await his response.     Carina Mcginnis  [facility] Electrophysiology   [phone]

## 2023-01-13 ENCOUNTER — PATIENT OUTREACH (OUTPATIENT)
Dept: CARDIOLOGY | Facility: CLINIC | Age: 28
End: 2023-01-13

## 2023-01-13 NOTE — TELEPHONE ENCOUNTER
Discontinuing ivabradine from med list per Dr Hollins's clinic note on 11/30/22.    decreasing ivabradine to daily end of Dec, then after a few days can stop. Take off med list  Received: 2 days ago  Rashmi Jones, RN  Rashmi Jones, RN

## 2023-02-06 ENCOUNTER — OFFICE VISIT (OUTPATIENT)
Dept: URGENT CARE | Facility: URGENT CARE | Age: 28
End: 2023-02-06
Payer: COMMERCIAL

## 2023-02-06 VITALS
DIASTOLIC BLOOD PRESSURE: 85 MMHG | SYSTOLIC BLOOD PRESSURE: 130 MMHG | HEART RATE: 80 BPM | RESPIRATION RATE: 18 BRPM | BODY MASS INDEX: 25.75 KG/M2 | OXYGEN SATURATION: 98 % | TEMPERATURE: 97.6 F | WEIGHT: 150 LBS

## 2023-02-06 DIAGNOSIS — J32.9 RECURRENT SINUS INFECTIONS: Primary | ICD-10-CM

## 2023-02-06 DIAGNOSIS — T88.7XXA MEDICATION SIDE EFFECTS: ICD-10-CM

## 2023-02-06 PROCEDURE — 99213 OFFICE O/P EST LOW 20 MIN: CPT | Performed by: PHYSICIAN ASSISTANT

## 2023-02-06 RX ORDER — CEPHALEXIN 500 MG/1
500 CAPSULE ORAL 3 TIMES DAILY
Qty: 30 CAPSULE | Refills: 0 | Status: SHIPPED | OUTPATIENT
Start: 2023-02-06 | End: 2023-06-27

## 2023-02-06 NOTE — PROGRESS NOTES
"  Assessment & Plan     Recurrent sinus infections    Discussed treatment options  Patient does not tolerate medications well  Has hx of C diff  Also takes flecainide which would interact with zpak  Patient asking for zpak for but not longer has indication for sinusitis and interacts with flecainide  - cephALEXin (KEFLEX) 500 MG capsule; Take 1 capsule (500 mg) by mouth 3 times daily    Medication side effects    Avoiding zpak due to medication interactions  zpak no longer indicated for sinusitis      Review of external notes as documented elsewhere in note       BMI:   Estimated body mass index is 25.75 kg/m  as calculated from the following:    Height as of 12/1/22: 1.626 m (5' 4\").    Weight as of this encounter: 68 kg (150 lb).       At today's visit with Aron Barbsoa , we discussed results, diagnosis, medications and formulated a plan.  We also discussed red flags for immediate return to clinic/ER, as well as indications for follow up with PCP if not improved in 3 days. Patient understood and agreed to plan. Aron RIYA Barbosa was discharged with stable vitals and has no further questions.       No follow-ups on file.    Frenando Hazel, [facility], PA-C  [facility] URGENT CARE [facility]    Maicol Sharp is a 27 year old, presenting for the following health issues:  URI (H/o of sinusitis, drainage and teeth pain and facial pressure )      HPI   Review of Systems   Constitutional, HEENT, cardiovascular, pulmonary, gi and gu systems are negative, except as otherwise noted.      Objective    /85   Pulse 80   Temp 97.6  F (36.4  C) (Tympanic)   Resp 18   Wt 68 kg (150 lb)   SpO2 98%   BMI 25.75 kg/m    Body mass index is 25.75 kg/m .  Physical Exam   GENERAL: healthy, alert and no distress  EYES: Eyes grossly normal to inspection, PERRL and conjunctivae and sclerae normal  HENT: normal cephalic/atraumatic, ear canals and TM's normal, nasal mucosa edematous , rhinorrhea purulent, oropharynx clear, oral " mucous membranes moist and sinuses: maxillary, frontal tenderness on both sides  NECK: no adenopathy, no asymmetry, masses, or scars and thyroid normal to palpation  RESP: lungs clear to auscultation - no rales, rhonchi or wheezes  CV: regular rate and rhythm, normal S1 S2, no S3 or S4, no murmur, click or rub, no peripheral edema and peripheral pulses strong  MS: no gross musculoskeletal defects noted, no edema  SKIN: no suspicious lesions or rashes  NEURO: Normal strength and tone, mentation intact and speech normal  PSYCH: mentation appears normal, affect normal/bright

## 2023-02-12 ENCOUNTER — HEALTH MAINTENANCE LETTER (OUTPATIENT)
Age: 28
End: 2023-02-12

## 2023-02-16 NOTE — TELEPHONE ENCOUNTER
REFERRAL INFORMATION:    Referring Provider:  Abbe Chu NP    Referring Clinic:  [facility]    Reason for Visit/Diagnosis: Diarrhea, unspecified type     FUTURE VISIT INFORMATION:    Appointment Date: 4/17/2023     NOTES STATUS DETAILS   OFFICE NOTE from Referring Provider Internal 11/1/2022 OV with SANAM Chu   OFFICE NOTE from Other Specialist Care Everywhere [facility]:   6/24/2022, 2/11/2022, 9/29/2021 GI OV with ARISTEO Castañeda    [facility] GI:   5/10/2022, 5/5/2022 OV with EFRA Johnson    [facility] Urgent Care:   2/15/2023 with AGGIE York   HOSPITAL DISCHARGE SUMMARY/  ED VISITS Care Everywhere 7/16/2022 [facility] ED  9/17/2021 [facility]    OPERATIVE REPORT Care Everywhere 9/1/2019 CHOLECYSTECTOMY LAPAROSCOPIC POSSIBLE OPEN    HI LAP,CHOLECYSTECTOMY    HI REMOVAL GALLBLADDER ([facility])   MEDICATION LIST Internal         ENDOSCOPY  Care Everywhere 6/14/2022 ([facility])  9/18/2021 ([facility])   COLONOSCOPY Care Everywhere 12/30/2020, 7/15/2020, 8/23/2019 ([facility])   SIGMOIDOSCOPY Care Everywhere 9/19/2021 ([facility])   EUS N/A    STOOL TESTING Care Everywhere [facility]:   2/15/2023    [facility]:   9/17/2021   PERTINENT LABS N/A    PATHOLOGY REPORTS (RELATED) N/A    IMAGING (CT, MRI, EGD, MRCP, Small Bowel Follow Through/SBT, MR/CT Enterography) Received  [facility] :   2/28/2022 NM Gastric Empty   9/17/2021 CT Abdomen pelvis    5/5/2021 XR Abdomen   5/3/2021 XR Abdomen    6/29/2020 CT Abdomen pelvis    6/28/2020 CT Abdomen Pelvis           Request sent to [facility] for [place] fax [phone]    Reports of imaging received and send to scanning.

## 2023-03-29 ENCOUNTER — HOSPITAL ENCOUNTER (OUTPATIENT)
Dept: MRI IMAGING | Facility: CLINIC | Age: 28
Discharge: HOME | End: 2023-03-29
Attending: NURSE PRACTITIONER | Admitting: NURSE PRACTITIONER
Payer: COMMERCIAL

## 2023-03-29 DIAGNOSIS — I48.0 PAF (PAROXYSMAL ATRIAL FIBRILLATION) (H): ICD-10-CM

## 2023-03-29 DIAGNOSIS — R94.31 SHORTENED PR INTERVAL: ICD-10-CM

## 2023-03-29 DIAGNOSIS — R00.0 WIDE-COMPLEX TACHYCARDIA: ICD-10-CM

## 2023-03-29 PROCEDURE — A9585 GADOBUTROL INJECTION: HCPCS | Performed by: NURSE PRACTITIONER

## 2023-03-29 PROCEDURE — 75561 CARDIAC MRI FOR MORPH W/DYE: CPT

## 2023-03-29 PROCEDURE — 255N000002 HC RX 255 OP 636: Performed by: NURSE PRACTITIONER

## 2023-03-29 PROCEDURE — 75561 CARDIAC MRI FOR MORPH W/DYE: CPT | Mod: 26 | Performed by: INTERNAL MEDICINE

## 2023-03-29 RX ORDER — GADOBUTROL 604.72 MG/ML
10 INJECTION INTRAVENOUS ONCE
Status: COMPLETED | OUTPATIENT
Start: 2023-03-29 | End: 2023-03-29

## 2023-03-29 RX ADMIN — GADOBUTROL 10 ML: 604.72 INJECTION INTRAVENOUS at 10:50

## 2023-04-17 ENCOUNTER — OFFICE VISIT (OUTPATIENT)
Dept: GASTROENTEROLOGY | Facility: CLINIC | Age: 28
End: 2023-04-17
Payer: COMMERCIAL

## 2023-04-17 ENCOUNTER — PRE VISIT (OUTPATIENT)
Dept: GASTROENTEROLOGY | Facility: CLINIC | Age: 28
End: 2023-04-17

## 2023-04-17 ENCOUNTER — LAB (OUTPATIENT)
Dept: LAB | Facility: CLINIC | Age: 28
End: 2023-04-17
Payer: COMMERCIAL

## 2023-04-17 VITALS
HEART RATE: 93 BPM | BODY MASS INDEX: 27.43 KG/M2 | DIASTOLIC BLOOD PRESSURE: 87 MMHG | SYSTOLIC BLOOD PRESSURE: 116 MMHG | RESPIRATION RATE: 16 BRPM | OXYGEN SATURATION: 97 % | WEIGHT: 159.8 LBS

## 2023-04-17 DIAGNOSIS — R19.7 DIARRHEA, UNSPECIFIED TYPE: ICD-10-CM

## 2023-04-17 DIAGNOSIS — R10.13 ABDOMINAL PAIN, EPIGASTRIC: ICD-10-CM

## 2023-04-17 DIAGNOSIS — R19.7 DIARRHEA, UNSPECIFIED TYPE: Primary | ICD-10-CM

## 2023-04-17 LAB
ALBUMIN SERPL BCG-MCNC: 4.9 G/DL (ref 3.5–5.2)
ALP SERPL-CCNC: 65 U/L (ref 40–129)
ALT SERPL W P-5'-P-CCNC: 38 U/L (ref 10–50)
ANION GAP SERPL CALCULATED.3IONS-SCNC: 17 MMOL/L (ref 7–15)
AST SERPL W P-5'-P-CCNC: 29 U/L (ref 10–50)
BILIRUB SERPL-MCNC: 0.4 MG/DL
BUN SERPL-MCNC: 16.1 MG/DL (ref 6–20)
CALCIUM SERPL-MCNC: 9.9 MG/DL (ref 8.6–10)
CHLORIDE SERPL-SCNC: 100 MMOL/L (ref 98–107)
CREAT SERPL-MCNC: 0.87 MG/DL (ref 0.67–1.17)
DEPRECATED HCO3 PLAS-SCNC: 24 MMOL/L (ref 22–29)
GFR SERPL CREATININE-BSD FRML MDRD: >90 ML/MIN/1.73M2
GLUCOSE SERPL-MCNC: 93 MG/DL (ref 70–99)
POTASSIUM SERPL-SCNC: 4.1 MMOL/L (ref 3.4–5.3)
PROT SERPL-MCNC: 7.5 G/DL (ref 6.4–8.3)
SODIUM SERPL-SCNC: 141 MMOL/L (ref 136–145)
TSH SERPL DL<=0.005 MIU/L-ACNC: 1.74 UIU/ML (ref 0.3–4.2)

## 2023-04-17 PROCEDURE — 82784 ASSAY IGA/IGD/IGG/IGM EACH: CPT

## 2023-04-17 PROCEDURE — 82784 ASSAY IGA/IGD/IGG/IGM EACH: CPT | Mod: 91

## 2023-04-17 PROCEDURE — 36415 COLL VENOUS BLD VENIPUNCTURE: CPT

## 2023-04-17 PROCEDURE — 86364 TISS TRNSGLTMNASE EA IG CLAS: CPT

## 2023-04-17 PROCEDURE — 99204 OFFICE O/P NEW MOD 45 MIN: CPT | Performed by: INTERNAL MEDICINE

## 2023-04-17 PROCEDURE — 84443 ASSAY THYROID STIM HORMONE: CPT

## 2023-04-17 PROCEDURE — 80053 COMPREHEN METABOLIC PANEL: CPT

## 2023-04-17 RX ORDER — PANTOPRAZOLE SODIUM 40 MG/1
40 TABLET, DELAYED RELEASE ORAL DAILY
COMMUNITY
End: 2023-06-27

## 2023-04-17 RX ORDER — TRAZODONE HYDROCHLORIDE 50 MG/1
1 TABLET, FILM COATED ORAL AT BEDTIME
COMMUNITY
Start: 2023-02-12 | End: 2023-04-17

## 2023-04-17 RX ORDER — AMITRIPTYLINE HYDROCHLORIDE 10 MG/1
10 TABLET ORAL AT BEDTIME
Qty: 45 TABLET | Refills: 1 | Status: SHIPPED | OUTPATIENT
Start: 2023-04-17

## 2023-04-17 NOTE — LETTER
"    4/17/2023         RE: Aron Barbosa  [address] [place] [place] [address]        Dear Colleague,    Thank you for referring your patient, Aron Barbosa, to the [facility] SPECIALTY CLINIC [place]. Please see a copy of my visit note below.      GASTROENTEROLOGY CONSULTATION       Site of Visit:   [address]     Provider:   Harsha Montero MD    PCP:   Abbe Chu    Chief Complaint:    Abdominal pain    Assessment:  This is a 27-year-old who back in 2017 developed C. difficile colitis.  Soon after he was diagnosed with POTS syndrome.  He began having difficulty with nausea vomiting cyclically.  He also developed IBS after multiple episodes C. difficile which required multiple fecal transplantation as well as Zinplava eventually which resolved it.  He did take vancomycin with antibiotics up until the time he came to [place].  However most recent course of Keflex a few months ago they refused to prescribe him vancomycin.  He is currently doing okay.  He is here to establish care.    Recommendations:   Amitriptyline 10 mg p.o. nightly  Stop trazodone  Blood work to include TSH and CMP    History of Present Illness:   This is a 27-year-old who has a complex gastroenterology history.  He tells me that things started back in 2017 when he contracted C. difficile.  Not sure how he got this he thinks he may have got it from someone.  Developed appendicitis after this.  Underwent appendectomy.  He was treated and then eventually ended up developing POTS syndrome.  He ended up getting ablated for atrial fibrillation.  However he also had some autonomic instability.  Was also diagnosed with \"Gastroparesis\" had a 90-minute study which was done in the setting of vomiting syndrome which does not sound like gastroparesis.  The patient has following history: Stomach was slow diagnosed.  Most days its fine.  Vomits a few times a month, he gets nauseated.  Then he runs to bathroom, sometimes two or three " times.  Sometimes in goes on for hours.   Goes and gets fluids after one of these events.  Within the past year twice.  Takes Zofran if nausea gets bad.  Times has to go in for IV fluids and IV Zofran..    During evaluation for abdominal pain was found to have a large gallbladder polyp on ultrasound and underwent cholecystectomy.  After cholecystectomy developed right upper quadrant abdominal pain and worse diarrhea.  Imaging studies have not shown any abnormalities.  He has some slightly elevated liver enzymes which were ultimately diagnosed as Walker given the fact that fatty liver on imaging.    C.difficile 4-5 times - Zinplava.  Stool transplant via colonoscopy x2.       IBS- Diarrhea after heavy meals.  If he has a fatty meal after finishes.  Sometime hes still has to run.  He does ok with light food.  No idea how he got this.      He uses trazodone for sleep  Medical Cannibas  5 mg THC in Gummies.    POTS syndrome diagnosed 4 years.     Uses midodrine and metoprolol  Recurrent sinus infections- doing ok after he took that.  His apartment black mold.      Loperamide as needed.  If the colestid.    Cholestryamine was changed to do Colestid.      2-3 years ago had a gallbladder removed.   Gets worse with.  Post-cholestyramine.        No family hx of GI problems    Appendicitis-  2017   C.diff May of 2017      Ablation afib.        FL ESOPHAGRAM BARIUM WITH AIR CONTRAST  DATE OF EXAM: 6/27/2022 8:14 AM.  CLINICAL INFORMATION: Odynophagia  FINDINGS: Double contrast esophagram performed.  Esophagus is normal in course and caliber. Normal esophageal motility. No  mass, stricture, or ulceration. Normal esophageal motility. No evidence of  spontaneous gastroesophageal reflux during the course of the examination  No hiatal hernia.  IMPRESSION: Unremarkable double contrast esophagram.      Maddy gallo [facility]:   Pt was originally seen for non-healing gastric ulcers on EGD 02/2018. This was repeated several times and was  negative for H. Pylori. H. Pylori treated empirically in 07/2018 and repeat EGD 07/2018 finally showed healed ulcers. 05/2017 had C. Difficile treated with metronidazole. 06/2017 colonoscopy unremarkable. 08/2019 colonoscopy done for rectal bleeding showed internal hemorrhoids. History of gallbladder polyp and s/p cholecystectomy for the same 09/2019. 10/02/2019 positive for C. Difficile again and treated with vancomycin. 03/06/2020 C. Difficile positive that responded to vancomycin taper but symptoms returned after stopping. 07/2020 underwent FMT. Underwent repeat FMT 12/30/2020 (Dr. Aldana). 09/2021 hospitalized for colitis from C. Difficile infection (treated by time of colonoscopy)-completed 2 weeks of hydrocortisone enemas. 11/30/2021 received a dose of Zinplava.     He also had known elevated LFTs. Saw hepatology 12/2019. Fibroscan and serology workup consistent with NAFLD and no further follow up necessary, per Dr. Zuniga. He was diagnosed with A fib earlier this year. Does not have WPW. Being seen at [facility].   PLAN:   History of C. Difficile:   -Increase Questran to 3/4 packet twice daily (1 bid too constipating historically)  -Can use hyoscyamine up to 4 times daily as needed for abdominal pain  -Avoid NSAIDs (ibuprofen, Motrin, Advil, naproxen, Aleve, Naprosyn)  -Recommend judicious use of antibiotics in the future given history of C diff              Current Outpatient Medications   Medication Sig Dispense Refill     colestipol (COLESTID) 1 g tablet Take 1 tablet (1 g) by mouth 2 times daily 60 tablet 3     eptinezumab-jjmr (VYEPTI) 100 MG/ML Inject 100 mg into the vein       hyoscyamine SL (LEVSIN/SL) 0.125 MG sublingual tablet Place 0.125 mg under the tongue       Magnesium Oxide 250 MG TABS ONE BY MOUTH TWICE DAILY TAKE WITH FOOD.       medical cannabis (Patient's own supply) See Admin Instructions (The purpose of this order is to document that the patient reports taking medical cannabis.  This is  not a prescription, and is not used to certify that the patient has a qualifying medical condition.)       metoprolol tartrate (LOPRESSOR) 25 MG tablet 25 mg qAM and 12.5 mg qPM       midodrine (PROAMATINE) 5 MG tablet Take 5 mg by mouth       pantoprazole (PROTONIX) 40 MG EC tablet Take 40 mg by mouth 2 times daily       Riboflavin 400 MG TABS Take 400 mg by mouth       traZODone (DESYREL) 50 MG tablet Take 1 tablet by mouth At Bedtime       acetaminophen (TYLENOL) 500 MG tablet Take 1,000 mg by mouth       albuterol (PROAIR HFA/PROVENTIL HFA/VENTOLIN HFA) 108 (90 Base) MCG/ACT inhaler Inhale 2 puffs into the lungs every 6 hours 18 g 3     azelastine (ASTELIN) 0.1 % nasal spray Spray 1 spray into both nostrils 2 times daily 30 mL 0     benzonatate (TESSALON) 100 MG capsule Take 1 capsule (100 mg) by mouth 3 times daily as needed for cough 30 capsule 0     cephALEXin (KEFLEX) 500 MG capsule Take 1 capsule (500 mg) by mouth 3 times daily (Patient not taking: Reported on 4/17/2023) 30 capsule 0     cyproheptadine (PERIACTIN) 4 MG tablet Take 1 tablet by mouth (Patient not taking: Reported on 4/17/2023)       diphenhydrAMINE (BENADRYL) 25 MG capsule Take 1 capsule by mouth       eletriptan (RELPAX) 20 MG tablet TAKE ONE TAB AT ONSET OF HEADACHE. MAY REPEAT IN 2HRS X 1 AS NEEDED. MAX 2 TABS IN 24 HRS.       flecainide (TAMBOCOR) 100 MG tablet Take 100 mg by mouth       guaiFENesin (MUCINEX) 600 MG 12 hr tablet Take 1,200 mg by mouth       ibuprofen (ADVIL/MOTRIN) 200 MG capsule Take 200 mg by mouth every 4 hours as needed for fever       lidocaine, viscous, (XYLOCAINE) 2 % solution Take 15 mLs by mouth       loperamide (IMODIUM A-D) 2 MG tablet Take 2 mg by mouth       mirtazapine (REMERON) 15 MG tablet Take 7.5 mg by mouth (Patient not taking: Reported on 11/30/2022)       ondansetron (ZOFRAN ODT) 4 MG ODT tab Take 1 tablet (4 mg) by mouth every 8 hours as needed for nausea 20 tablet 0     pantoprazole (PROTONIX) 40 MG  EC tablet Take 40 mg by mouth daily       promethazine (PHENERGAN) 12.5 MG tablet Take 1 tablet by mouth         Past Surgical History:   Procedure Laterality Date     APPENDECTOMY       CHOLECYSTECTOMY       tonsilctomy         Past Medical History:   Diagnosis Date     Gastroesophageal reflux disease      Heart disease      Uncomplicated asthma        Family History   Problem Relation Age of Onset     Uterine Cancer Sister      Asthma Brother      Atrial fibrillation Maternal Grandmother      Asthma Paternal Grandmother         reports that he has never smoked. He has never used smokeless tobacco. He reports current alcohol use. He reports that he does not currently use drugs.         Physical Exam:   /87 (BP Location: Right arm, Patient Position: Sitting, Cuff Size: Adult Regular)   Pulse 93   Resp 16   Wt 72.5 kg (159 lb 12.8 oz)   SpO2 97%   BMI 27.43 kg/m    Wt:   Wt Readings from Last 2 Encounters:   04/17/23 72.5 kg (159 lb 12.8 oz)   02/06/23 68 kg (150 lb)      Constitutional: cooperative, pleasant, not dyspneic/diaphoretic, no acute distress  Eyes: Sclera anicteric/injected  Ears/nose/mouth/throat: Normal oropharynx without ulcers or exudate, mucus membranes moist, hearing intact  Neck: supple, thyroid normal size  Cardiac: Regular rate and rhythm no murmurs rubs or gallops.  CV: No edema  Respiratory: Unlabored breathing, no wheezes rales or ronchi  Lymph: No axillary, submandibular, supraclavicular or inguinal lymphadenopathy  Abd: Nondistended, +bs, no hepatosplenomegaly, nontender, no peritoneal signs  Skin: warm, perfused, no jaundice  Neuro: AAO x 3, No asterixis  Psych: Normal affect  MSK: Normal gait    Wt Readings from Last 2 Encounters:   04/17/23 72.5 kg (159 lb 12.8 oz)   02/06/23 68 kg (150 lb)               Again, thank you for allowing me to participate in the care of your patient.        Sincerely,        Harsha Montero MD

## 2023-04-17 NOTE — PATIENT INSTRUCTIONS
It was a pleasure taking care of you today. I've included a brief summary of our discussion and care plan from today's visit below.  Please review this information with your primary care provider.  _______________________________________________________________________  Recommendations:  Amitriptyline 10 mg po at bedtime     Return to GI Clinic in 4-6 months to review your progress.     If you need any follow-up appointments, please use the following phone numbers below.    To schedule or reschedule a follow-up GI appointment, call [phone] option 1    To schedule your endoscopy procedure, call [phone] option 2    To schedule imaging, please call [phone]     To schedule your lab appointment at [address] floor lab call [phone]. Call your [place] lab directly if it is not [facility]. If you use a non-[place] lab, please let us know where to fax your lab order (call or message Renetta at [phone] option 1).      _______________________________________________________________________    Please be in touch if there are any further questions that arise following today's visit.  There are multiple ways to contact your gastroenterology care team.      During business hours, you may reach your gastroenterology RN Care Coordinator, Renetta, at [phone] option 1.      You can always send a secure message through [employer]. [employer] messages are answered by your nurse or doctor typically within 24 hours. Please allow extra time on weekends and holidays.     What is [employer]?  [employer] is a secure way for you to access all of your healthcare records from the [facility].  It is a web based computer program, so you can sign on to it from any location.  It also allows you to send secure messages to your care team.  I recommend signing up for [employer] access if you have not already done so and are comfortable with using a computer.     For urgent/emergent questions after  business hours, you may reach the on-call GI Fellow by contacting the [facility]  at [phone].     How will I get the results of any tests ordered?    You will receive all of your results.  If you have signed up for [employer], any tests ordered at your visit will be available to you after your physician reviews them.  Typically this takes 1-2 weeks.  If there are urgent results that require a change in your care plan, your physician or nurse will call you to discuss the next steps.      Thank you for choosing [facility]!       Sincerely,    Harsha Montero MD  [facility]  Division of Gastroenterology

## 2023-04-18 LAB
IGA SERPL-MCNC: 234 MG/DL (ref 84–499)
IGG SERPL-MCNC: 853 MG/DL (ref 610–1616)

## 2023-04-19 LAB
TTG IGA SER-ACNC: 0.5 U/ML
TTG IGG SER-ACNC: 0.8 U/ML

## 2023-04-24 ENCOUNTER — APPOINTMENT (OUTPATIENT)
Dept: CT IMAGING | Facility: CLINIC | Age: 28
End: 2023-04-24
Attending: STUDENT IN AN ORGANIZED HEALTH CARE EDUCATION/TRAINING PROGRAM
Payer: COMMERCIAL

## 2023-04-24 ENCOUNTER — HOSPITAL ENCOUNTER (EMERGENCY)
Facility: CLINIC | Age: 28
Discharge: HOME | End: 2023-04-24
Attending: STUDENT IN AN ORGANIZED HEALTH CARE EDUCATION/TRAINING PROGRAM | Admitting: STUDENT IN AN ORGANIZED HEALTH CARE EDUCATION/TRAINING PROGRAM
Payer: COMMERCIAL

## 2023-04-24 VITALS
RESPIRATION RATE: 16 BRPM | OXYGEN SATURATION: 98 % | TEMPERATURE: 98 F | SYSTOLIC BLOOD PRESSURE: 140 MMHG | DIASTOLIC BLOOD PRESSURE: 85 MMHG | HEART RATE: 85 BPM

## 2023-04-24 DIAGNOSIS — S06.0X0A CONCUSSION WITHOUT LOSS OF CONSCIOUSNESS, INITIAL ENCOUNTER: ICD-10-CM

## 2023-04-24 DIAGNOSIS — S09.90XA CLOSED HEAD INJURY, INITIAL ENCOUNTER: ICD-10-CM

## 2023-04-24 PROCEDURE — 70450 CT HEAD/BRAIN W/O DYE: CPT

## 2023-04-24 PROCEDURE — 250N000013 HC RX MED GY IP 250 OP 250 PS 637: Performed by: STUDENT IN AN ORGANIZED HEALTH CARE EDUCATION/TRAINING PROGRAM

## 2023-04-24 PROCEDURE — 99284 EMERGENCY DEPT VISIT MOD MDM: CPT | Performed by: STUDENT IN AN ORGANIZED HEALTH CARE EDUCATION/TRAINING PROGRAM

## 2023-04-24 PROCEDURE — 70450 CT HEAD/BRAIN W/O DYE: CPT | Mod: 26 | Performed by: STUDENT IN AN ORGANIZED HEALTH CARE EDUCATION/TRAINING PROGRAM

## 2023-04-24 PROCEDURE — 99284 EMERGENCY DEPT VISIT MOD MDM: CPT | Mod: 25 | Performed by: STUDENT IN AN ORGANIZED HEALTH CARE EDUCATION/TRAINING PROGRAM

## 2023-04-24 PROCEDURE — 250N000011 HC RX IP 250 OP 636: Performed by: STUDENT IN AN ORGANIZED HEALTH CARE EDUCATION/TRAINING PROGRAM

## 2023-04-24 RX ORDER — IBUPROFEN 600 MG/1
600 TABLET, FILM COATED ORAL ONCE
Status: COMPLETED | OUTPATIENT
Start: 2023-04-24 | End: 2023-04-24

## 2023-04-24 RX ORDER — ONDANSETRON 4 MG/1
4 TABLET, ORALLY DISINTEGRATING ORAL EVERY 6 HOURS PRN
Qty: 12 TABLET | Refills: 0 | Status: SHIPPED | OUTPATIENT
Start: 2023-04-24 | End: 2023-04-27

## 2023-04-24 RX ORDER — ONDANSETRON 4 MG/1
4 TABLET, ORALLY DISINTEGRATING ORAL ONCE
Status: COMPLETED | OUTPATIENT
Start: 2023-04-24 | End: 2023-04-24

## 2023-04-24 RX ORDER — ACETAMINOPHEN 500 MG
1000 TABLET ORAL EVERY 6 HOURS PRN
Qty: 112 TABLET | Refills: 0 | Status: SHIPPED | OUTPATIENT
Start: 2023-04-24 | End: 2023-05-08

## 2023-04-24 RX ORDER — ACETAMINOPHEN 325 MG/1
975 TABLET ORAL ONCE
Status: COMPLETED | OUTPATIENT
Start: 2023-04-24 | End: 2023-04-24

## 2023-04-24 RX ORDER — IBUPROFEN 600 MG/1
600 TABLET, FILM COATED ORAL EVERY 6 HOURS PRN
Qty: 56 TABLET | Refills: 0 | Status: SHIPPED | OUTPATIENT
Start: 2023-04-24 | End: 2023-05-08

## 2023-04-24 RX ADMIN — ONDANSETRON 4 MG: 4 TABLET, ORALLY DISINTEGRATING ORAL at 12:08

## 2023-04-24 RX ADMIN — IBUPROFEN 600 MG: 600 TABLET, FILM COATED ORAL at 12:08

## 2023-04-24 RX ADMIN — ACETAMINOPHEN 975 MG: 325 TABLET ORAL at 12:08

## 2023-04-24 ASSESSMENT — ACTIVITIES OF DAILY LIVING (ADL): ADLS_ACUITY_SCORE: 35
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
## 2023-04-24 NOTE — DISCHARGE INSTRUCTIONS
Concussion Clinic Referral: [facility] will call you to coordinate your care as prescribed by the provider.  If you don t hear from a representative within 2 business days, please call [phone].
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
## 2023-04-27 ENCOUNTER — OFFICE VISIT (OUTPATIENT)
Dept: FAMILY MEDICINE | Facility: CLINIC | Age: 28
End: 2023-04-27
Payer: COMMERCIAL

## 2023-04-27 VITALS
HEART RATE: 70 BPM | TEMPERATURE: 96.4 F | DIASTOLIC BLOOD PRESSURE: 82 MMHG | HEIGHT: 64 IN | OXYGEN SATURATION: 99 % | SYSTOLIC BLOOD PRESSURE: 120 MMHG | WEIGHT: 156 LBS | BODY MASS INDEX: 26.63 KG/M2

## 2023-04-27 DIAGNOSIS — S09.90XD CLOSED HEAD INJURY, SUBSEQUENT ENCOUNTER: Primary | ICD-10-CM

## 2023-04-27 DIAGNOSIS — S06.0X0D CONCUSSION WITHOUT LOSS OF CONSCIOUSNESS, SUBSEQUENT ENCOUNTER: ICD-10-CM

## 2023-04-27 PROCEDURE — 99213 OFFICE O/P EST LOW 20 MIN: CPT | Performed by: PHYSICIAN ASSISTANT

## 2023-04-27 ASSESSMENT — PAIN SCALES - GENERAL: PAINLEVEL: MODERATE PAIN (5)

## 2023-04-27 NOTE — LETTER
April 27, 2023    RE  Aron Barbosa  [address] [address]        To Whom It May Concern:    Aron Barbosa was seen in our clinic. He sustained a head injury last week and is suffering from a concussion. He may return to work with the following restrictions: limited to 2 hour workday and Aron should have the ability to take 15 minute breaks as much as needed. These restrictions should remain in place for the next 2 weeks. Aron has been referred to a concussion specialist for further recommendations.       Sincerely,        Kitty Douglass PA-C

## 2023-04-27 NOTE — PROGRESS NOTES
Assessment & Plan     Closed head injury, subsequent encounter  Concussion without loss of consciousness, subsequent encounter  Has concussion referral in place but has not scheduled yet, phone number given to schedule.   Brain rest discussed, letter written for work (limit shifts to 2 hours with breaks as needed) and class (requesting deadline extensions).   OK to continue zofran prn nausea  Alternate tylenol and ibuprofen/naproxen for pain   Further recommendations per concussion clinic    25 minutes spent by me on the date of the encounter doing chart review, history and exam, documentation and further activities per the note       MED REC REQUIRED  Post Medication Reconciliation Status:  Discharge medications reconciled, continue medications without change    Follow up with concussion clinic, here shantan     AMANDA Pires [facility] BERTA Sharp is a 27 year old, presenting for the following health issues:  Hospital F/U (Concussion follow up)        4/27/2023     9:54 AM   Additional Questions   Roomed by max     [facility]     Hospital Follow-up Visit:  [facility] ED 4/24 after head injury that occured a few days prior 4/21  Head CT negative  Referred to concussion clinic    Hospital/Nursing Home/IP Rehab Facility: GENERAL: healthy, alert and no distress  EYES: Eyes grossly normal to inspection, PERRL and conjunctivae and sclerae normal  HENT: ear canals and TM's normal, nose and mouth without ulcers or lesions  NECK: no adenopathy, no asymmetry, masses, or scars and thyroid normal to palpation  RESP: lungs clear to auscultation - no rales, rhonchi or wheezes  CV: regular rate and rhythm, normal S1 S2, no S3 or S4, no murmur, click or rub, no peripheral edema and peripheral pulses strong  MS: no gross musculoskeletal defects noted, no edema  SKIN: no suspicious lesions or rashes  NEURO: Normal strength and tone, mentation intact and speech normal  PSYCH: mentation appears normal,  "affect normal/bright  Date of Admission: 04/24/023  Date of Discharge: 04/24/23  Reason(s) for Admission: head injury    Was your hospitalization related to COVID-19? No   Problems taking medications regularly:  None  Medication changes since discharge: None  Problems adhering to non-medication therapy:  None    Summary of hospitalization:  [facility] discharge summary reviewed  Diagnostic Tests/Treatments reviewed.  Follow up needed: none  Other Healthcare Providers Involved in Patient s Care:         referred to concussion clinic  Update since discharge: stable.     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      Objective    /82   Pulse 70   Temp (!) 96.4  F (35.8  C) (Temporal)   Ht 1.626 m (5' 4\")   Wt 70.8 kg (156 lb)   SpO2 99%   BMI 26.78 kg/m    Body mass index is 26.78 kg/m .  Physical Exam   GENERAL: healthy, alert and no distress  EYES: Eyes grossly normal to inspection, PERRL and conjunctivae and sclerae normal  HENT: ear canals and TM's normal, nose and mouth without ulcers or lesions  NECK: no adenopathy, no asymmetry, masses, or scars and thyroid normal to palpation  RESP: lungs clear to auscultation - no rales, rhonchi or wheezes  CV: regular rate and rhythm, normal S1 S2, no S3 or S4, no murmur, click or rub, no peripheral edema and peripheral pulses strong  MS: no gross musculoskeletal defects noted, no " edema  SKIN: no suspicious lesions or rashes  NEURO: Normal strength and tone, mentation intact and speech normal  PSYCH: mentation appears normal, affect normal/bright

## 2023-04-27 NOTE — PATIENT INSTRUCTIONS
Call [phone] to schedule an appointment with concussion clinic    - tylenol 1,000 mg up to 4 times daily (no more than 4,000 mg in a day)  Alternate with:  - ibuprofen 800 mg up to 3 times daily with food   OR  Aleve 2 tablets (440 mg) up to twice daily with food.

## 2023-05-10 ENCOUNTER — OFFICE VISIT (OUTPATIENT)
Dept: PHYSICAL MEDICINE AND REHAB | Facility: CLINIC | Age: 28
End: 2023-05-10
Payer: COMMERCIAL

## 2023-05-10 DIAGNOSIS — S06.0XAA CONCUSSION WITH UNKNOWN LOSS OF CONSCIOUSNESS STATUS, INITIAL ENCOUNTER: Primary | ICD-10-CM

## 2023-05-10 PROCEDURE — 99205 OFFICE O/P NEW HI 60 MIN: CPT | Performed by: PHYSICAL MEDICINE & REHABILITATION
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
## 2023-05-12 ENCOUNTER — HOSPITAL ENCOUNTER (OUTPATIENT)
Dept: CARDIOLOGY | Facility: CLINIC | Age: 28
Discharge: HOME | End: 2023-05-12
Attending: INTERNAL MEDICINE | Admitting: INTERNAL MEDICINE
Payer: COMMERCIAL

## 2023-05-12 DIAGNOSIS — I49.3 PVC'S (PREMATURE VENTRICULAR CONTRACTIONS): ICD-10-CM

## 2023-05-12 PROCEDURE — 93248 EXT ECG>7D<15D REV&INTERPJ: CPT | Performed by: INTERNAL MEDICINE

## 2023-05-12 PROCEDURE — 93246 EXT ECG>7D<15D RECORDING: CPT

## 2023-06-21 ENCOUNTER — OFFICE VISIT (OUTPATIENT)
Dept: FAMILY MEDICINE | Facility: CLINIC | Age: 28
End: 2023-06-21
Payer: COMMERCIAL

## 2023-06-21 VITALS
RESPIRATION RATE: 18 BRPM | HEART RATE: 77 BPM | HEIGHT: 63 IN | WEIGHT: 157 LBS | SYSTOLIC BLOOD PRESSURE: 119 MMHG | TEMPERATURE: 98.1 F | DIASTOLIC BLOOD PRESSURE: 83 MMHG | OXYGEN SATURATION: 97 % | BODY MASS INDEX: 27.82 KG/M2

## 2023-06-21 DIAGNOSIS — R63.1 INCREASED THIRST: Primary | ICD-10-CM

## 2023-06-21 DIAGNOSIS — R40.0 DAYTIME SLEEPINESS: ICD-10-CM

## 2023-06-21 LAB
ALBUMIN UR-MCNC: NEGATIVE MG/DL
ANION GAP SERPL CALCULATED.3IONS-SCNC: 10 MMOL/L (ref 7–15)
APPEARANCE UR: CLEAR
BILIRUB UR QL STRIP: NEGATIVE
BUN SERPL-MCNC: 11 MG/DL (ref 6–20)
CALCIUM SERPL-MCNC: 9.8 MG/DL (ref 8.6–10)
CHLORIDE SERPL-SCNC: 100 MMOL/L (ref 98–107)
COLOR UR AUTO: YELLOW
CREAT SERPL-MCNC: 0.79 MG/DL (ref 0.67–1.17)
DEPRECATED HCO3 PLAS-SCNC: 27 MMOL/L (ref 22–29)
GFR SERPL CREATININE-BSD FRML MDRD: >90 ML/MIN/1.73M2
GLUCOSE SERPL-MCNC: 109 MG/DL (ref 70–99)
GLUCOSE UR STRIP-MCNC: NEGATIVE MG/DL
HGB UR QL STRIP: NEGATIVE
KETONES UR STRIP-MCNC: NEGATIVE MG/DL
LEUKOCYTE ESTERASE UR QL STRIP: NEGATIVE
NITRATE UR QL: NEGATIVE
PH UR STRIP: 7 [PH] (ref 5–7)
POTASSIUM SERPL-SCNC: 5.6 MMOL/L (ref 3.4–5.3)
SODIUM SERPL-SCNC: 137 MMOL/L (ref 136–145)
SP GR UR STRIP: <=1.005 (ref 1–1.03)
TSH SERPL DL<=0.005 MIU/L-ACNC: 2.15 UIU/ML (ref 0.3–4.2)
UROBILINOGEN UR STRIP-ACNC: 0.2 E.U./DL

## 2023-06-21 PROCEDURE — 80048 BASIC METABOLIC PNL TOTAL CA: CPT | Performed by: FAMILY MEDICINE

## 2023-06-21 PROCEDURE — 84443 ASSAY THYROID STIM HORMONE: CPT | Performed by: FAMILY MEDICINE

## 2023-06-21 PROCEDURE — 36415 COLL VENOUS BLD VENIPUNCTURE: CPT | Performed by: FAMILY MEDICINE

## 2023-06-21 PROCEDURE — 81003 URINALYSIS AUTO W/O SCOPE: CPT | Performed by: FAMILY MEDICINE

## 2023-06-21 PROCEDURE — 99214 OFFICE O/P EST MOD 30 MIN: CPT | Performed by: FAMILY MEDICINE

## 2023-06-21 NOTE — PROGRESS NOTES
"  Assessment & Plan   Problem List Items Addressed This Visit    None  Visit Diagnoses     Increased thirst    -  Primary    Relevant Orders    TSH with free T4 reflex    Basic metabolic panel  (Ca, Cl, CO2, Creat, Gluc, K, Na, BUN)    UA Macroscopic with reflex to Microscopic and Culture    Daytime sleepiness        Relevant Orders    TSH with free T4 reflex    Basic metabolic panel  (Ca, Cl, CO2, Creat, Gluc, K, Na, BUN)    Adult Sleep Eval & Management  Referral         Unclear prognosis     BMI:   Estimated body mass index is 27.81 kg/m  as calculated from the following:    Height as of this encounter: 1.6 m (5' 3\").    Weight as of this encounter: 71.2 kg (157 lb).         RAYMOND BASHIR DO  [facility] LINO Sharp is a 27 year old, presenting for the following health issues:  Increase Thirst (Drinking tons of liquid but still feels thirst and dry throat.) and Sleep Problem (Waking up super tired like he has been out partying all night but hasn't been.)        6/21/2023    10:02 AM   Additional Questions   Roomed by Philly JOHNS CMA     History of Present Illness       Reason for visit:  Increased thirst, waking up groggy everyday  Symptom onset:  More than a month  Symptoms include:  Increasd thirst, feeling groggy when waking up  Symptom intensity:  Moderate  Symptom progression:  Worsening  Had these symptoms before:  Yes  Has tried/received treatment for these symptoms:  No    He eats 2-3 servings of fruits and vegetables daily.He consumes 1 sweetened beverage(s) daily.He exercises with enough effort to increase his heart rate 60 or more minutes per day.  He exercises with enough effort to increase his heart rate 7 days per week.   He is taking medications regularly.     Onset several months ago, but much worse in past 2 months  Feels like hangover each morning  No snoring  No waking at night typically  Mother has sleep apnea  Dry mouth  Strong family history of " "diabetes (unknown type)  Patient states Family History of Diabetes in his family.      Review of Systems         Objective    /83 (BP Location: Right arm, Patient Position: Chair, Cuff Size: Adult Large)   Pulse 77   Temp 98.1  F (36.7  C) (Oral)   Resp 18   Ht 1.6 m (5' 3\")   Wt 71.2 kg (157 lb)   SpO2 97%   BMI 27.81 kg/m    Body mass index is 27.81 kg/m .  Physical Exam   GENERAL: healthy, alert and no distress  EYES: Eyes grossly normal to inspection, PERRL and conjunctivae and sclerae normal  HENT: ear canals and TM's normal, nose and mouth without ulcers or lesions  NECK: no adenopathy, no asymmetry, masses, or scars and thyroid normal to palpation  RESP: lungs clear to auscultation - no rales, rhonchi or wheezes  CV: regular rate and rhythm, normal S1 S2, no S3 or S4, no murmur, click or rub, no peripheral edema and peripheral pulses strong  ABDOMEN: soft, nontender, no hepatosplenomegaly, no masses and bowel sounds normal  MS: no gross musculoskeletal defects noted, no edema                    "

## 2023-06-22 DIAGNOSIS — E87.5 SERUM POTASSIUM ELEVATED: Primary | ICD-10-CM

## 2023-06-27 ENCOUNTER — LAB (OUTPATIENT)
Dept: LAB | Facility: CLINIC | Age: 28
End: 2023-06-27
Payer: COMMERCIAL

## 2023-06-27 DIAGNOSIS — R73.09 ELEVATED GLUCOSE: ICD-10-CM

## 2023-06-27 DIAGNOSIS — R73.09 ELEVATED GLUCOSE: Primary | ICD-10-CM

## 2023-06-27 DIAGNOSIS — E87.5 SERUM POTASSIUM ELEVATED: ICD-10-CM

## 2023-06-27 LAB — HBA1C MFR BLD: 5.3 % (ref 0–5.6)

## 2023-06-27 PROCEDURE — 84132 ASSAY OF SERUM POTASSIUM: CPT

## 2023-06-27 PROCEDURE — 36415 COLL VENOUS BLD VENIPUNCTURE: CPT

## 2023-06-27 PROCEDURE — 83036 HEMOGLOBIN GLYCOSYLATED A1C: CPT

## 2023-06-28 LAB — POTASSIUM SERPL-SCNC: 4.4 MMOL/L (ref 3.4–5.3)

## 2023-07-03 ENCOUNTER — TELEPHONE (OUTPATIENT)
Dept: FAMILY MEDICINE | Facility: CLINIC | Age: 28
End: 2023-07-03
Payer: COMMERCIAL

## 2023-07-03 DIAGNOSIS — R73.09 ELEVATED GLUCOSE: Primary | ICD-10-CM

## 2023-07-03 NOTE — TELEPHONE ENCOUNTER
"Patient calling stating he has been having excessive sleepiness between the hours of 3-530am     Patient bought a glucometer - took BG at 0400 when he woke up with the result of 278; reports having a \"hangover\" feeling with some nausea and feeling groggy     Reports this is the same feeling he has been having for the past few months    BG reading now at 100    Last meal at 5pm last night    Has been checking BG throughout the day x 4 days and reports it has been normal; the only abnormal reading was at 4 am.    Patient requesting rx accucheck strips and also lancets if possible (no dx of diabetes)    Lab Results   Component Value Date    A1C 5.3 06/27/2023     Patient is requesting further steps     Last seen in office on 6/21/23 for these same symptoms; lab work had returned normal     Jamila W[facility]      "

## 2023-07-06 NOTE — TELEPHONE ENCOUNTER
I spoke with Aron on the phone. He says several mornings his blood glucometer has shown over 200 - I advised he connect with our DM/Nutrition expert for 24 hour monitoring, given his HgA1c was 5.3%. Early islet cell fatigue? Unclear prognosis. Dr Dajuan Sierra

## 2023-07-12 ENCOUNTER — TELEPHONE (OUTPATIENT)
Dept: GASTROENTEROLOGY | Facility: CLINIC | Age: 28
End: 2023-07-12
Payer: COMMERCIAL

## 2023-07-12 NOTE — TELEPHONE ENCOUNTER
I could not LVM for PT but sent the PT a COM DEVhart message, needing to reschedule the Dr. Montero appt.  Per ROLLY Jackson we can schedule with JENNYFER Olivo on 8/28.

## 2023-07-19 ENCOUNTER — MYC MEDICAL ADVICE (OUTPATIENT)
Dept: FAMILY MEDICINE | Facility: CLINIC | Age: 28
End: 2023-07-19
Payer: COMMERCIAL

## 2023-07-19 DIAGNOSIS — R73.09 ELEVATED GLUCOSE: Primary | ICD-10-CM

## 2023-11-06 ENCOUNTER — OFFICE VISIT (OUTPATIENT)
Dept: URGENT CARE | Facility: URGENT CARE | Age: 28
End: 2023-11-06
Payer: COMMERCIAL

## 2023-11-06 VITALS
DIASTOLIC BLOOD PRESSURE: 83 MMHG | TEMPERATURE: 98.9 F | SYSTOLIC BLOOD PRESSURE: 120 MMHG | BODY MASS INDEX: 27.81 KG/M2 | RESPIRATION RATE: 20 BRPM | WEIGHT: 157 LBS | OXYGEN SATURATION: 98 % | HEART RATE: 91 BPM

## 2023-11-06 DIAGNOSIS — R07.0 THROAT PAIN: ICD-10-CM

## 2023-11-06 DIAGNOSIS — J32.9 BACTERIAL SINUSITIS: ICD-10-CM

## 2023-11-06 DIAGNOSIS — R05.9 COUGH, UNSPECIFIED TYPE: Primary | ICD-10-CM

## 2023-11-06 DIAGNOSIS — R07.89 CHEST DISCOMFORT: ICD-10-CM

## 2023-11-06 DIAGNOSIS — B96.89 BACTERIAL SINUSITIS: ICD-10-CM

## 2023-11-06 LAB
DEPRECATED S PYO AG THROAT QL EIA: NEGATIVE
GROUP A STREP BY PCR: NOT DETECTED
SARS-COV-2 RNA RESP QL NAA+PROBE: NEGATIVE

## 2023-11-06 PROCEDURE — 99214 OFFICE O/P EST MOD 30 MIN: CPT | Performed by: PHYSICIAN ASSISTANT

## 2023-11-06 PROCEDURE — 87651 STREP A DNA AMP PROBE: CPT | Performed by: PHYSICIAN ASSISTANT

## 2023-11-06 PROCEDURE — 87635 SARS-COV-2 COVID-19 AMP PRB: CPT | Performed by: PHYSICIAN ASSISTANT

## 2023-11-06 RX ORDER — CETIRIZINE HYDROCHLORIDE, PSEUDOEPHEDRINE HYDROCHLORIDE 5; 120 MG/1; MG/1
1 TABLET, FILM COATED, EXTENDED RELEASE ORAL 2 TIMES DAILY
Qty: 20 TABLET | Refills: 0 | Status: SHIPPED | OUTPATIENT
Start: 2023-11-06

## 2023-11-06 RX ORDER — CEFDINIR 300 MG/1
300 CAPSULE ORAL 2 TIMES DAILY
Qty: 20 CAPSULE | Refills: 0 | Status: SHIPPED | OUTPATIENT
Start: 2023-11-06 | End: 2023-11-16

## 2023-11-06 RX ORDER — PREDNISONE 20 MG/1
20 TABLET ORAL 2 TIMES DAILY
Qty: 10 TABLET | Refills: 0 | Status: SHIPPED | OUTPATIENT
Start: 2023-11-06

## 2023-11-06 RX ORDER — LIDOCAINE HYDROCHLORIDE 20 MG/ML
15 SOLUTION OROPHARYNGEAL
Qty: 100 ML | Refills: 0 | Status: SHIPPED | OUTPATIENT
Start: 2023-11-06

## 2023-11-06 NOTE — PROGRESS NOTES
Assessment & Plan     Cough, unspecified type    COVID PENDING, CHECK MY CHART     You have been diagnosed with a viral URI.  A viral respiratory infection is an infection of the nose, sinuses, or throat caused by a virus. Colds and the flu are common types of viral respiratory infections.  The symptoms of a viral respiratory infection often start quickly. They include a fever, sore throat, and runny nose. You may also just not feel well. Or you may not want to eat much.  Most viral infections can be treated with home care. This may include drinking lots of fluids and taking over-the-counter pain medicine. You will probably feel better in 4 to 10 days.  Antibiotics are not used to treat a viral infection. Antibiotics don't kill viruses, so they won't help cure a viral illness.    - Symptomatic COVID-19 Virus (Coronavirus) by PCR Nose  - cetirizine-pseudoePHEDrine ER (ZYRTEC-D) 5-120 MG 12 hr tablet; Take 1 tablet by mouth 2 times daily    Throat pain    You had a strep test done today that was neg.  We will perform a strep culture and if any positive results come back we will call you and call in antibiotics.  At this time, this appears to be a viral infection and requires symptomatic treatment.  Most viral sore throats will resolve with in a few days.  If your throat pain worsens then please be  rechecked in the  or by your PCP.    - Streptococcus A Rapid Screen w/Reflex to PCR - Clinic Collect  - Group A Streptococcus PCR Throat Swab  - lidocaine, viscous, (XYLOCAINE) 2 % solution; Swish and spit 15 mLs in mouth every 3 hours as needed ; Max 8 doses/24 hour period.    Bacterial sinusitis    You have been diagnosed with a bacterial sinus infection. Sinusitis can occur during a cold. It can also happen due to allergies to pollens and other particles in the air.  A sinus infection can sometimes cause fever, headache, and facial pain. There is often green or yellow fluid draining from the nose or into the back of  "the throat (post-nasal drip). This infection is treated with antibiotics.  Home care  Take the full course of antibiotics as instructed. Don't stop taking them, even when you feel better.  Drink plenty of water, hot tea, and other liquids as directed by the healthcare provider. This may help thin nasal mucus. It also may help your sinuses drain fluids.  Heat may help soothe painful areas of your face. Use a towel soaked in hot water. Or,  the shower and direct the warm spray onto your face. Using a vaporizer along with a menthol rub at night may also help soothe symptoms.     - cefdinir (OMNICEF) 300 MG capsule; Take 1 capsule (300 mg) by mouth 2 times daily for 10 days  - cetirizine-pseudoePHEDrine ER (ZYRTEC-D) 5-120 MG 12 hr tablet; Take 1 tablet by mouth 2 times daily    Chest discomfort    Likely from coughing  Prednisone to help with sinus headache and chest wall discomfort  - predniSONE (DELTASONE) 20 MG tablet; Take 1 tablet (20 mg) by mouth 2 times daily         BMI:   Estimated body mass index is 27.81 kg/m  as calculated from the following:    Height as of 6/21/23: 1.6 m (5' 3\").    Weight as of this encounter: 71.2 kg (157 lb).     At today's visit with Aron Barbosa , we discussed results, diagnosis, medications and formulated a plan.  We also discussed red flags for immediate return to clinic/ER, as well as indications for follow up with PCP if not improved in 3 days. Patient understood and agreed to plan. Aron Barbosa was discharged with stable vitals and has no further questions.     No follow-ups on file.    Fernando Hazel, [facility], PA-CHANDRA  [facility]    Maicol Sharp is a 27 year old, presenting for the following health issues:  Cough and Pharyngitis      HPI   Review of Systems   Constitutional, HEENT, cardiovascular, pulmonary, GI, , musculoskeletal, neuro, skin, endocrine and psych systems are negative, except as otherwise noted.      Objective    BP " 120/83   Pulse 91   Temp 98.9  F (37.2  C)   Resp 20   Wt 71.2 kg (157 lb)   SpO2 98%   BMI 27.81 kg/m    Body mass index is 27.81 kg/m .  Physical Exam   GENERAL: healthy, alert and no distress  EYES: Eyes grossly normal to inspection, PERRL and conjunctivae and sclerae normal  HENT: normal cephalic/atraumatic, ear canals and TM's normal, nasal mucosa edematous , rhinorrhea purulent, oropharynx clear, oral mucous membranes moist, and sinuses: maxillary, frontal tenderness on generalized  NECK: no adenopathy, no asymmetry, masses, or scars and thyroid normal to palpation  RESP: lungs clear to auscultation - no rales, rhonchi or wheezes  CV: regular rate and rhythm, normal S1 S2, no S3 or S4, no murmur, click or rub, no peripheral edema and peripheral pulses strong  MS: no gross musculoskeletal defects noted, no edema  SKIN: no suspicious lesions or rashes  NEURO: Normal strength and tone, mentation intact and speech normal  PSYCH: mentation appears normal, affect normal/bright        Results for orders placed or performed in visit on 11/06/23   Streptococcus A Rapid Screen w/Reflex to PCR - Clinic Collect     Status: Normal    Specimen: Throat; Swab   Result Value Ref Range    Group A Strep antigen Negative Negative

## 2023-11-28 ENCOUNTER — OFFICE VISIT (OUTPATIENT)
Dept: FAMILY MEDICINE | Facility: CLINIC | Age: 28
End: 2023-11-28
Payer: COMMERCIAL

## 2023-11-28 VITALS
TEMPERATURE: 97.6 F | DIASTOLIC BLOOD PRESSURE: 88 MMHG | SYSTOLIC BLOOD PRESSURE: 135 MMHG | HEART RATE: 76 BPM | OXYGEN SATURATION: 96 %

## 2023-11-28 DIAGNOSIS — Z86.69 HISTORY OF MIGRAINE: ICD-10-CM

## 2023-11-28 DIAGNOSIS — G43.709 CHRONIC MIGRAINE W/O AURA W/O STATUS MIGRAINOSUS, NOT INTRACTABLE: ICD-10-CM

## 2023-11-28 DIAGNOSIS — H53.9 VISION CHANGES: Primary | ICD-10-CM

## 2023-11-28 DIAGNOSIS — I48.0 PAF (PAROXYSMAL ATRIAL FIBRILLATION) (H): ICD-10-CM

## 2023-11-28 DIAGNOSIS — B35.3 ATHLETE'S FOOT ON LEFT: ICD-10-CM

## 2023-11-28 PROCEDURE — 99214 OFFICE O/P EST MOD 30 MIN: CPT

## 2023-11-28 RX ORDER — PRENATAL VIT 91/IRON/FOLIC/DHA 28-975-200
COMBINATION PACKAGE (EA) ORAL 2 TIMES DAILY
Qty: 30 G | Refills: 0 | Status: SHIPPED | OUTPATIENT
Start: 2023-11-28 | End: 2023-12-28

## 2023-11-28 ASSESSMENT — ENCOUNTER SYMPTOMS
WEAKNESS: 0
CONSTITUTIONAL NEGATIVE: 1
NUMBNESS: 0
TREMORS: 0
SPEECH DIFFICULTY: 0
PARESTHESIAS: 0
LIGHT-HEADEDNESS: 0
RESPIRATORY NEGATIVE: 1
SEIZURES: 0
HEADACHES: 1
CARDIOVASCULAR NEGATIVE: 1

## 2023-11-28 ASSESSMENT — VISUAL ACUITY: OU: 1

## 2023-11-28 NOTE — LETTER
November 28, 2023      Aron Barbosa  [address] [address]        To Whom It May Concern:    Aron Barbosa  was seen on 11/28/23.  Please excuse him until 11/28/23 due to illness. If repeated occurrence of symptoms, to immediately go to ED.         Sincerely,      AMANDA Ventura [facility]

## 2023-11-28 NOTE — PROGRESS NOTES
Assessment & Plan       ICD-10-CM    1. Vision changes  H53.9 Adult Neurology  Referral      2. History of migraine  Z86.69 Adult Neurology  Referral      3. Athlete's foot on left  B35.3 terbinafine (LAMISIL) 1 % external cream      4. PAF (paroxysmal atrial fibrillation) (H)  I48.0       5. Chronic migraine w/o aura w/o status migrainosus, not intractable  G43.709          To use terbinafine for athlete's foot x 1 month.     Reviewed signs and symptoms of stroke. To follow with Neurology for work up such as MRI. Reviewed concerning signs and when to return to ED immediately such as repeat event, new or worsening symptoms. At this time, appears to be possible migraine vs tension head ache.       Follow up with primary care provider with any problems, questions or concerns or if symptoms worsen or fail to improve. Patient agreed to plan and verbalized understanding.     Subjective     Aron is a 27 year old male who presents to clinic today for the following health issues:  Chief Complaint   Patient presents with    Urgent Care     HA and vision problem today. The vision is no longer a problem but still has a HA. HA is more on the frontal area    Foot Problems     1 month has been having itchy left foot. Pt have used otc medication and has not helped.     Aron presents with reports of itchy left foot x 1 month. He has tried OTC athlete's foot cream x 1 week and has not had improvement. He notes flaking at the base of the toes.     He also reports that he had headache, nausea then emesis x 1 this afternoon. He states that he had blurry vision that went away after vomiting. He reports history of migraines which he sees Neurology for and has medication for but he has never experienced these symptoms before. He states his vision has fully come back and his head ache is mild at this time. He denies speech changes, weakness or facial drooping. He does have history of atrial fibrillation.              Review of Systems   Constitutional: Negative.    Eyes:  Positive for visual disturbance.   Respiratory: Negative.     Cardiovascular: Negative.    Skin:  Positive for rash.   Neurological:  Positive for headaches. Negative for tremors, seizures, syncope, speech difficulty, weakness, light-headedness, numbness and paresthesias.       Problem List:  2022-04: Chronic migraine w/o aura w/o status migrainosus, not   intractable  2022-01: History of Franklin-Parkinson-White syndrome  2021-12: Anticoagulation adequate  2021-07: PAF (paroxysmal atrial fibrillation) (H)  2021-07: Wide-complex tachycardia  2021-07: ADD (attention deficit disorder)  2021-07: GERD (gastroesophageal reflux disease)  2021-07: IBS (irritable bowel syndrome)  2021-07: Palpitations  2021-07: PUD (peptic ulcer disease)  2021-07: Shortened VA interval  2020-10: Abdominal pain  2020-06: Recurrent Clostridioides difficile infection  2020-01: Nausea  2019-12: NAFLD (nonalcoholic fatty liver disease)  2019-12: Recurrent UTI  2019-10: Diarrhea  2019-08: Gallbladder polyp  2018-10: History of Clostridium difficile infection  2018-08: History of gastric ulcer  2018-03: POTS (postural orthostatic tachycardia syndrome)  2018-01: Recurrent infections  2017-12: WPW (Franklin-Parkinson-White syndrome)  2015-05: Epididymal cyst  2014-01: Tension headache, chronic  2007-10: Allergic rhinitis      Past Medical History:   Diagnosis Date    Gastroesophageal reflux disease     Heart disease     Uncomplicated asthma        Social History     Tobacco Use    Smoking status: Never     Passive exposure: Current    Smokeless tobacco: Never   Substance Use Topics    Alcohol use: Yes     Comment: occa 1/2 per wk           Objective    /88   Pulse 76   Temp 97.6  F (36.4  C) (Tympanic)   SpO2 96%   Physical Exam  Constitutional:       Appearance: Normal appearance.   HENT:      Head: Normocephalic and atraumatic.   Eyes:      General: Lids are normal. Vision  grossly intact.      Extraocular Movements: Extraocular movements intact.      Conjunctiva/sclera: Conjunctivae normal.   Cardiovascular:      Rate and Rhythm: Normal rate and regular rhythm.      Heart sounds: Normal heart sounds.   Pulmonary:      Effort: Pulmonary effort is normal.      Breath sounds: Normal breath sounds.   Musculoskeletal:         General: Normal range of motion.      Cervical back: Normal range of motion and neck supple.   Skin:     General: Skin is warm and dry.      Comments: Flaking of plantar surface of left foot at base of toes   Neurological:      General: No focal deficit present.      Mental Status: He is alert and oriented to person, place, and time.      GCS: GCS eye subscore is 4. GCS verbal subscore is 5. GCS motor subscore is 6.      Cranial Nerves: Cranial nerves 2-12 are intact.      Sensory: Sensation is intact.      Motor: Motor function is intact.      Coordination: Coordination is intact.      Gait: Gait is intact.   Psychiatric:         Attention and Perception: Attention and perception normal.         Mood and Affect: Mood normal.         Speech: Speech normal.         Behavior: Behavior normal.         Thought Content: Thought content normal.         Cognition and Memory: Cognition and memory normal.         Judgment: Judgment normal.              Berny Bonilla PA-C

## 2023-11-30 ENCOUNTER — APPOINTMENT (OUTPATIENT)
Dept: CT IMAGING | Facility: CLINIC | Age: 28
End: 2023-11-30
Attending: EMERGENCY MEDICINE
Payer: COMMERCIAL

## 2023-11-30 ENCOUNTER — HOSPITAL ENCOUNTER (OUTPATIENT)
Facility: CLINIC | Age: 28
Setting detail: OBSERVATION
Discharge: HOME | End: 2023-11-30
Attending: EMERGENCY MEDICINE | Admitting: EMERGENCY MEDICINE
Payer: COMMERCIAL

## 2023-11-30 VITALS
DIASTOLIC BLOOD PRESSURE: 76 MMHG | BODY MASS INDEX: 27.46 KG/M2 | TEMPERATURE: 98.3 F | WEIGHT: 155 LBS | SYSTOLIC BLOOD PRESSURE: 117 MMHG | RESPIRATION RATE: 18 BRPM | OXYGEN SATURATION: 95 % | HEART RATE: 99 BPM

## 2023-11-30 DIAGNOSIS — R11.0 NAUSEA: ICD-10-CM

## 2023-11-30 DIAGNOSIS — R11.10 INTRACTABLE VOMITING: ICD-10-CM

## 2023-11-30 DIAGNOSIS — K52.9 GASTROENTERITIS: ICD-10-CM

## 2023-11-30 LAB
ALBUMIN SERPL BCG-MCNC: 4.4 G/DL (ref 3.5–5.2)
ALBUMIN UR-MCNC: 20 MG/DL
ALP SERPL-CCNC: 56 U/L (ref 40–150)
ALT SERPL W P-5'-P-CCNC: 57 U/L (ref 0–70)
ANION GAP SERPL CALCULATED.3IONS-SCNC: 10 MMOL/L (ref 7–15)
APPEARANCE UR: CLEAR
AST SERPL W P-5'-P-CCNC: 44 U/L (ref 0–45)
BASOPHILS # BLD AUTO: 0.1 10E3/UL (ref 0–0.2)
BASOPHILS NFR BLD AUTO: 0 %
BILIRUB SERPL-MCNC: 1 MG/DL
BILIRUB UR QL STRIP: NEGATIVE
BUN SERPL-MCNC: 14.9 MG/DL (ref 6–20)
CALCIUM SERPL-MCNC: 8.9 MG/DL (ref 8.6–10)
CHLORIDE SERPL-SCNC: 103 MMOL/L (ref 98–107)
COLOR UR AUTO: YELLOW
CREAT SERPL-MCNC: 0.89 MG/DL (ref 0.67–1.17)
DEPRECATED HCO3 PLAS-SCNC: 28 MMOL/L (ref 22–29)
EGFRCR SERPLBLD CKD-EPI 2021: >90 ML/MIN/1.73M2
EOSINOPHIL # BLD AUTO: 0.1 10E3/UL (ref 0–0.7)
EOSINOPHIL NFR BLD AUTO: 1 %
ERYTHROCYTE [DISTWIDTH] IN BLOOD BY AUTOMATED COUNT: 12.3 % (ref 10–15)
FLUAV RNA SPEC QL NAA+PROBE: NEGATIVE
FLUBV RNA RESP QL NAA+PROBE: NEGATIVE
GLUCOSE SERPL-MCNC: 115 MG/DL (ref 70–99)
GLUCOSE UR STRIP-MCNC: NEGATIVE MG/DL
HCT VFR BLD AUTO: 54.5 % (ref 40–53)
HGB BLD-MCNC: 18.4 G/DL (ref 13.3–17.7)
HGB UR QL STRIP: NEGATIVE
IMM GRANULOCYTES # BLD: 0 10E3/UL
IMM GRANULOCYTES NFR BLD: 0 %
KETONES UR STRIP-MCNC: NEGATIVE MG/DL
LEUKOCYTE ESTERASE UR QL STRIP: NEGATIVE
LIPASE SERPL-CCNC: 23 U/L (ref 13–60)
LYMPHOCYTES # BLD AUTO: 0.6 10E3/UL (ref 0.8–5.3)
LYMPHOCYTES NFR BLD AUTO: 4 %
MCH RBC QN AUTO: 29.2 PG (ref 26.5–33)
MCHC RBC AUTO-ENTMCNC: 33.8 G/DL (ref 31.5–36.5)
MCV RBC AUTO: 86 FL (ref 78–100)
MONOCYTES # BLD AUTO: 0.7 10E3/UL (ref 0–1.3)
MONOCYTES NFR BLD AUTO: 5 %
MUCOUS THREADS #/AREA URNS LPF: PRESENT /LPF
NEUTROPHILS # BLD AUTO: 12 10E3/UL (ref 1.6–8.3)
NEUTROPHILS NFR BLD AUTO: 90 %
NITRATE UR QL: NEGATIVE
NRBC # BLD AUTO: 0 10E3/UL
NRBC BLD AUTO-RTO: 0 /100
PH UR STRIP: 6 [PH] (ref 5–7)
PLATELET # BLD AUTO: 354 10E3/UL (ref 150–450)
POTASSIUM SERPL-SCNC: 4.4 MMOL/L (ref 3.4–5.3)
PROT SERPL-MCNC: 6.7 G/DL (ref 6.4–8.3)
RBC # BLD AUTO: 6.31 10E6/UL (ref 4.4–5.9)
RBC URINE: 0 /HPF
RSV RNA SPEC NAA+PROBE: NEGATIVE
SARS-COV-2 RNA RESP QL NAA+PROBE: NEGATIVE
SODIUM SERPL-SCNC: 141 MMOL/L (ref 135–145)
SP GR UR STRIP: 1.02 (ref 1–1.03)
SQUAMOUS EPITHELIAL: <1 /HPF
UROBILINOGEN UR STRIP-MCNC: NORMAL MG/DL
WBC # BLD AUTO: 13.5 10E3/UL (ref 4–11)
WBC URINE: 0 /HPF

## 2023-11-30 PROCEDURE — 96361 HYDRATE IV INFUSION ADD-ON: CPT

## 2023-11-30 PROCEDURE — 96376 TX/PRO/DX INJ SAME DRUG ADON: CPT

## 2023-11-30 PROCEDURE — 81001 URINALYSIS AUTO W/SCOPE: CPT | Performed by: EMERGENCY MEDICINE

## 2023-11-30 PROCEDURE — 85025 COMPLETE CBC W/AUTO DIFF WBC: CPT | Performed by: EMERGENCY MEDICINE

## 2023-11-30 PROCEDURE — 99285 EMERGENCY DEPT VISIT HI MDM: CPT | Performed by: EMERGENCY MEDICINE

## 2023-11-30 PROCEDURE — 99222 1ST HOSP IP/OBS MODERATE 55: CPT | Performed by: STUDENT IN AN ORGANIZED HEALTH CARE EDUCATION/TRAINING PROGRAM

## 2023-11-30 PROCEDURE — 250N000011 HC RX IP 250 OP 636: Mod: JZ | Performed by: STUDENT IN AN ORGANIZED HEALTH CARE EDUCATION/TRAINING PROGRAM

## 2023-11-30 PROCEDURE — 250N000011 HC RX IP 250 OP 636: Mod: JZ | Performed by: EMERGENCY MEDICINE

## 2023-11-30 PROCEDURE — 70450 CT HEAD/BRAIN W/O DYE: CPT | Mod: 26 | Performed by: RADIOLOGY

## 2023-11-30 PROCEDURE — 258N000003 HC RX IP 258 OP 636: Performed by: EMERGENCY MEDICINE

## 2023-11-30 PROCEDURE — 83690 ASSAY OF LIPASE: CPT | Performed by: EMERGENCY MEDICINE

## 2023-11-30 PROCEDURE — 96375 TX/PRO/DX INJ NEW DRUG ADDON: CPT

## 2023-11-30 PROCEDURE — 87637 SARSCOV2&INF A&B&RSV AMP PRB: CPT | Performed by: EMERGENCY MEDICINE

## 2023-11-30 PROCEDURE — 70450 CT HEAD/BRAIN W/O DYE: CPT

## 2023-11-30 PROCEDURE — 96374 THER/PROPH/DIAG INJ IV PUSH: CPT

## 2023-11-30 PROCEDURE — 80053 COMPREHEN METABOLIC PANEL: CPT | Performed by: EMERGENCY MEDICINE

## 2023-11-30 PROCEDURE — 99285 EMERGENCY DEPT VISIT HI MDM: CPT | Mod: 25

## 2023-11-30 PROCEDURE — 250N000013 HC RX MED GY IP 250 OP 250 PS 637: Performed by: EMERGENCY MEDICINE

## 2023-11-30 PROCEDURE — 36415 COLL VENOUS BLD VENIPUNCTURE: CPT | Performed by: EMERGENCY MEDICINE

## 2023-11-30 PROCEDURE — G0378 HOSPITAL OBSERVATION PER HR: HCPCS

## 2023-11-30 RX ORDER — CETIRIZINE HYDROCHLORIDE, PSEUDOEPHEDRINE HYDROCHLORIDE 5; 120 MG/1; MG/1
1 TABLET, FILM COATED, EXTENDED RELEASE ORAL 2 TIMES DAILY
Status: DISCONTINUED | OUTPATIENT
Start: 2023-11-30 | End: 2023-11-30

## 2023-11-30 RX ORDER — PANTOPRAZOLE SODIUM 40 MG/1
40 TABLET, DELAYED RELEASE ORAL 2 TIMES DAILY
Status: DISCONTINUED | OUTPATIENT
Start: 2023-11-30 | End: 2023-11-30 | Stop reason: HOSPADM

## 2023-11-30 RX ORDER — ONDANSETRON 4 MG/1
4 TABLET, ORALLY DISINTEGRATING ORAL EVERY 8 HOURS PRN
Qty: 20 TABLET | Refills: 0 | Status: SHIPPED | OUTPATIENT
Start: 2023-11-30

## 2023-11-30 RX ORDER — AZELASTINE 1 MG/ML
1 SPRAY, METERED NASAL 2 TIMES DAILY
Status: DISCONTINUED | OUTPATIENT
Start: 2023-11-30 | End: 2023-11-30 | Stop reason: HOSPADM

## 2023-11-30 RX ORDER — PROCHLORPERAZINE MALEATE 10 MG
10 TABLET ORAL EVERY 6 HOURS PRN
Status: DISCONTINUED | OUTPATIENT
Start: 2023-11-30 | End: 2023-11-30 | Stop reason: HOSPADM

## 2023-11-30 RX ORDER — PROCHLORPERAZINE 25 MG
25 SUPPOSITORY, RECTAL RECTAL EVERY 12 HOURS PRN
Status: DISCONTINUED | OUTPATIENT
Start: 2023-11-30 | End: 2023-11-30 | Stop reason: HOSPADM

## 2023-11-30 RX ORDER — PSEUDOEPHEDRINE HCL 120 MG/1
120 TABLET, FILM COATED, EXTENDED RELEASE ORAL 2 TIMES DAILY
Status: DISCONTINUED | OUTPATIENT
Start: 2023-11-30 | End: 2023-11-30 | Stop reason: HOSPADM

## 2023-11-30 RX ORDER — ONDANSETRON 2 MG/ML
4 INJECTION INTRAMUSCULAR; INTRAVENOUS EVERY 6 HOURS PRN
Status: DISCONTINUED | OUTPATIENT
Start: 2023-11-30 | End: 2023-11-30 | Stop reason: HOSPADM

## 2023-11-30 RX ORDER — TRAZODONE HYDROCHLORIDE 50 MG/1
50 TABLET, FILM COATED ORAL
Status: DISCONTINUED | OUTPATIENT
Start: 2023-11-30 | End: 2023-11-30 | Stop reason: HOSPADM

## 2023-11-30 RX ORDER — AMOXICILLIN 250 MG
1 CAPSULE ORAL 2 TIMES DAILY PRN
Status: DISCONTINUED | OUTPATIENT
Start: 2023-11-30 | End: 2023-11-30 | Stop reason: HOSPADM

## 2023-11-30 RX ORDER — AMOXICILLIN 250 MG
2 CAPSULE ORAL 2 TIMES DAILY PRN
Status: DISCONTINUED | OUTPATIENT
Start: 2023-11-30 | End: 2023-11-30 | Stop reason: HOSPADM

## 2023-11-30 RX ORDER — SERTRALINE HYDROCHLORIDE 100 MG/1
100 TABLET, FILM COATED ORAL PRN
COMMUNITY
Start: 2023-05-31

## 2023-11-30 RX ORDER — ONDANSETRON 2 MG/ML
4 INJECTION INTRAMUSCULAR; INTRAVENOUS ONCE
Status: COMPLETED | OUTPATIENT
Start: 2023-11-30 | End: 2023-11-30

## 2023-11-30 RX ORDER — METOPROLOL TARTRATE 25 MG/1
25 TABLET, FILM COATED ORAL
Status: DISCONTINUED | OUTPATIENT
Start: 2023-12-01 | End: 2023-11-30 | Stop reason: HOSPADM

## 2023-11-30 RX ORDER — AMITRIPTYLINE HYDROCHLORIDE 10 MG/1
10 TABLET ORAL AT BEDTIME
Status: DISCONTINUED | OUTPATIENT
Start: 2023-11-30 | End: 2023-11-30 | Stop reason: HOSPADM

## 2023-11-30 RX ORDER — ALBUTEROL SULFATE 90 UG/1
2 AEROSOL, METERED RESPIRATORY (INHALATION) EVERY 6 HOURS
Status: DISCONTINUED | OUTPATIENT
Start: 2023-11-30 | End: 2023-11-30 | Stop reason: HOSPADM

## 2023-11-30 RX ORDER — KETOROLAC TROMETHAMINE 15 MG/ML
15 INJECTION, SOLUTION INTRAMUSCULAR; INTRAVENOUS ONCE
Status: COMPLETED | OUTPATIENT
Start: 2023-11-30 | End: 2023-11-30

## 2023-11-30 RX ORDER — ACETAMINOPHEN 500 MG
1000 TABLET ORAL EVERY 6 HOURS PRN
COMMUNITY

## 2023-11-30 RX ORDER — ACETAMINOPHEN 500 MG
1000 TABLET ORAL ONCE
Status: COMPLETED | OUTPATIENT
Start: 2023-11-30 | End: 2023-11-30

## 2023-11-30 RX ORDER — LANOLIN ALCOHOL/MO/W.PET/CERES
3 CREAM (GRAM) TOPICAL
COMMUNITY

## 2023-11-30 RX ORDER — CETIRIZINE HYDROCHLORIDE 5 MG/1
5 TABLET ORAL 2 TIMES DAILY
Status: DISCONTINUED | OUTPATIENT
Start: 2023-11-30 | End: 2023-11-30 | Stop reason: HOSPADM

## 2023-11-30 RX ORDER — ONDANSETRON 4 MG/1
4 TABLET, ORALLY DISINTEGRATING ORAL EVERY 6 HOURS PRN
Status: DISCONTINUED | OUTPATIENT
Start: 2023-11-30 | End: 2023-11-30 | Stop reason: HOSPADM

## 2023-11-30 RX ORDER — MIDODRINE HYDROCHLORIDE 5 MG/1
5 TABLET ORAL DAILY PRN
Status: DISCONTINUED | OUTPATIENT
Start: 2023-11-30 | End: 2023-11-30 | Stop reason: HOSPADM

## 2023-11-30 RX ORDER — ACETAMINOPHEN 325 MG/1
650 TABLET ORAL EVERY 4 HOURS PRN
Status: DISCONTINUED | OUTPATIENT
Start: 2023-11-30 | End: 2023-11-30 | Stop reason: HOSPADM

## 2023-11-30 RX ORDER — TRAZODONE HYDROCHLORIDE 50 MG/1
50 TABLET, FILM COATED ORAL AT BEDTIME
COMMUNITY
Start: 2023-05-31

## 2023-11-30 RX ORDER — SODIUM CHLORIDE 9 MG/ML
1000 INJECTION, SOLUTION INTRAVENOUS CONTINUOUS
Status: DISCONTINUED | OUTPATIENT
Start: 2023-11-30 | End: 2023-11-30 | Stop reason: HOSPADM

## 2023-11-30 RX ADMIN — ONDANSETRON 4 MG: 2 INJECTION INTRAMUSCULAR; INTRAVENOUS at 14:32

## 2023-11-30 RX ADMIN — ONDANSETRON 4 MG: 2 INJECTION INTRAMUSCULAR; INTRAVENOUS at 18:03

## 2023-11-30 RX ADMIN — KETOROLAC TROMETHAMINE 15 MG: 15 INJECTION INTRAMUSCULAR; INTRAVENOUS at 13:35

## 2023-11-30 RX ADMIN — ONDANSETRON 4 MG: 2 INJECTION INTRAMUSCULAR; INTRAVENOUS at 12:21

## 2023-11-30 RX ADMIN — SODIUM CHLORIDE 1000 ML: 9 INJECTION, SOLUTION INTRAVENOUS at 13:35

## 2023-11-30 RX ADMIN — SODIUM CHLORIDE 1000 ML: 9 INJECTION, SOLUTION INTRAVENOUS at 15:37

## 2023-11-30 RX ADMIN — SODIUM CHLORIDE 1000 ML: 9 INJECTION, SOLUTION INTRAVENOUS at 12:21

## 2023-11-30 RX ADMIN — ACETAMINOPHEN 1000 MG: 500 TABLET ORAL at 14:08

## 2023-11-30 ASSESSMENT — ACTIVITIES OF DAILY LIVING (ADL)
ADLS_ACUITY_SCORE: 35

## 2023-11-30 NOTE — ED PROVIDER NOTES
ED Provider Note  [facility]      History     Chief Complaint   Patient presents with    Nausea & Vomiting     HPI  Aron Babrosa is a 27 year old male PMH of PAF,  who presents to the ER for evaluation of N/V.  The patient reports for last 2 days he has had some nausea vomiting.  Initially seem to be improving but then came back last night.  There are some associated abdominal cramps.  No changes in bowel movements or urination.  He has not taken thing for his symptoms.    Patient notes history of migraines and that he does have a headache today which is somewhat similar to his prior migraines.  He attributes his headache to feeling dehydrated.    Patient also reports couple days ago he had some bilateral transient blurry vision which resolved spontaneously.  Denies history of auras with his migraines.  No numbness or weakness.  No current vision changes.    He had presented to a clinic today for his vomiting and as he had mentioned the vision changes was then told he should go to the emergency department.    Past Medical History  Past Medical History:   Diagnosis Date    Gastroesophageal reflux disease     Heart disease     Uncomplicated asthma      Past Surgical History:   Procedure Laterality Date    APPENDECTOMY      CHOLECYSTECTOMY      tonsilctomy       amitriptyline (ELAVIL) 10 MG tablet  metoprolol tartrate (LOPRESSOR) 25 MG tablet  predniSONE (DELTASONE) 20 MG tablet  albuterol (PROAIR HFA/PROVENTIL HFA/VENTOLIN HFA) 108 (90 Base) MCG/ACT inhaler  azelastine (ASTELIN) 0.1 % nasal spray  blood glucose (NO BRAND SPECIFIED) test strip  blood glucose monitoring (NO BRAND SPECIFIED) meter device kit  cetirizine-pseudoePHEDrine ER (ZYRTEC-D) 5-120 MG 12 hr tablet  colestipol (COLESTID) 1 g tablet  diphenhydrAMINE (BENADRYL) 25 MG capsule  eletriptan (RELPAX) 20 MG tablet  eptinezumab-jjmr (VYEPTI) 100 MG/ML  flecainide (TAMBOCOR) 100 MG tablet  guaiFENesin (MUCINEX) 600 MG 12 hr  tablet  hyoscyamine SL (LEVSIN/SL) 0.125 MG sublingual tablet  lidocaine, viscous, (XYLOCAINE) 2 % solution  lidocaine, viscous, (XYLOCAINE) 2 % solution  loperamide (IMODIUM A-D) 2 MG tablet  Magnesium Oxide 250 MG TABS  medical cannabis (Patient's own supply)  midodrine (PROAMATINE) 5 MG tablet  ondansetron (ZOFRAN ODT) 4 MG ODT tab  pantoprazole (PROTONIX) 40 MG EC tablet  Riboflavin 400 MG TABS  terbinafine (LAMISIL) 1 % external cream      Allergies   Allergen Reactions    Metoclopramide Anxiety, Nausea and Vomiting and Other (See Comments)     Other reaction(s): Anxiety, GI Upset, Nausea And Vomiting, OTHER (explain in comments), Other (See Comments)  Other reaction(s): ANXIETY, GI UPSET  Other reaction(s): OTHER (explain in comments)  Other reaction(s): ANXIETY, GI UPSET  Other reaction(s): GI Upset, Other (See Comments)  Other reaction(s): ANXIETY, GI UPSET  Other reaction(s): OTHER (explain in comments)  Other reaction(s): ANXIETY, GI UPSET  Other reaction(s): ANXIETY, GI UPSET    Other reaction(s): OTHER (explain in comments)  Other reaction(s): ANXIETY, GI UPSET    Other reaction(s): ANXIETY, GI UPSET  Other reaction(s): OTHER (explain in comments)  Other reaction(s): ANXIETY, GI UPSET  Other reaction(s): GI Upset, Other (See Comments)  Other reaction(s): ANXIETY, GI UPSET  Other reaction(s): OTHER (explain in comments)  Other reaction(s): ANXIETY, GI UPSET  Other reaction(s): ANXIETY, GI UPSET  Other reaction(s): ANXIETY, GI UPSET  Other reaction(s): OTHER (explain in comments)  Other reaction(s): ANXIETY, GI UPSET  Other reaction(s): ANXIETY, GI UPSET  Other reaction(s): ANXIETY, GI UPSET      Nicotine Shortness Of Breath    Smoke.      Other reaction(s): Other (See Comments)  Allergic reaction to exposure to vaping - respiratory distress and wheezing  Allergic reaction to exposure to vaping - respiratory distress and wheezing    Tobacco      Other reaction(s): OTHER (explain in comments)  Allergic  reaction to exposure to vaping - respiratory distress and wheezing    Amoxicillin Rash     Other reaction(s): Rash, SKIN - rash  Other reaction(s): RASH  Other reaction(s): RASH  Other reaction(s): SKIN - rash  Other reaction(s): RASH  Other reaction(s): RASH  Other reaction(s): RASH  Other reaction(s): RASH    Other reaction(s): RASH  Rash as a toddler  Rash as a toddler    Other reaction(s): SKIN - rash  Other reaction(s): RASH    Other reaction(s): RASH  Rash as a toddler  Other reaction(s): RASH  Other reaction(s): RASH  Other reaction(s): SKIN - rash  Other reaction(s): RASH  Other reaction(s): RASH  Other reaction(s): RASH  Other reaction(s): RASH    Other reaction(s): RASH  Rash as a toddler  Rash as a toddler  Other reaction(s): RASH  Other reaction(s): RASH  Other reaction(s): RASH  Tolerates cephalosporins  Rash as a toddler      Prochlorperazine Anxiety     Family History  Family History   Problem Relation Age of Onset    Uterine Cancer Sister     Asthma Brother     Atrial fibrillation Maternal Grandmother     Asthma Paternal Grandmother      Social History   Social History     Tobacco Use    Smoking status: Never     Passive exposure: Current    Smokeless tobacco: Never   Vaping Use    Vaping Use: Never used   Substance Use Topics    Alcohol use: Yes     Comment: occa 1/2 per wk    Drug use: Not Currently         A medically appropriate review of systems was performed with pertinent positives and negatives noted in the HPI, and all other systems negative.    Physical Exam   BP: 113/75  Pulse: 120  Temp: 97.6  F (36.4  C)  Resp: 18  Weight: 70.3 kg (155 lb)  SpO2: 95 %  Physical Exam  Constitutional:       General: He is not in acute distress.     Appearance: Normal appearance. He is not toxic-appearing.   HENT:      Head: Normocephalic and atraumatic.      Nose: Nose normal. No congestion.      Mouth/Throat:      Mouth: Mucous membranes are moist.      Pharynx: Oropharynx is clear.   Eyes:      General:  No scleral icterus.     Extraocular Movements: Extraocular movements intact.      Conjunctiva/sclera: Conjunctivae normal.      Pupils: Pupils are equal, round, and reactive to light.   Cardiovascular:      Rate and Rhythm: Normal rate and regular rhythm.   Pulmonary:      Effort: Pulmonary effort is normal. No respiratory distress.      Breath sounds: No wheezing or rales.   Abdominal:      General: There is no distension.      Palpations: Abdomen is soft.      Tenderness: There is no abdominal tenderness. There is no guarding or rebound.   Musculoskeletal:         General: Normal range of motion.      Cervical back: Normal range of motion and neck supple. No rigidity.   Skin:     General: Skin is warm and dry.   Neurological:      General: No focal deficit present.      Mental Status: He is alert and oriented to person, place, and time.      Sensory: No sensory deficit.      Motor: No weakness.      Gait: Gait normal.           ED Course, Procedures, & Data      Procedures                      Results for orders placed or performed during the hospital encounter of 11/30/23   CT Head w/o Contrast     Status: None    Narrative    CT HEAD WO CONTRAST 11/30/2023 12:59 PM    History: headache, vomiting, vision changes    Comparison: CT 4/24/2023    Technique: Using multidetector thin collimation helical acquisition  technique, axial, coronal and sagittal CT images from the skull base  to the vertex were obtained without intravenous contrast.   (topogram) image(s) also obtained and reviewed.    Findings: There is no intracranial hemorrhage, mass effect, or midline  shift. Gray/white matter differentiation in both cerebral hemispheres  is preserved. Ventricles are stable in size compared to CT from  4/24/2023 and proportionate to the cerebral sulci. The basal cisterns  are clear.  Presumed cerumen in the bilateral external auditory canals.  The bony calvaria and the bones of the skull base are normal.  The  visualized portions of the paranasal sinuses and mastoid air cells are  clear.      Impression    Impression:  No acute intracranial pathology.     I have personally reviewed the examination and initial interpretation  and I agree with the findings.    HENRY PARNELL MD         SYSTEM ID:  [id number]   Comprehensive metabolic panel     Status: Abnormal   Result Value Ref Range    Sodium 141 135 - 145 mmol/L    Potassium 4.4 3.4 - 5.3 mmol/L    Carbon Dioxide (CO2) 28 22 - 29 mmol/L    Anion Gap 10 7 - 15 mmol/L    Urea Nitrogen 14.9 6.0 - 20.0 mg/dL    Creatinine 0.89 0.67 - 1.17 mg/dL    GFR Estimate >90 >60 mL/min/1.73m2    Calcium 8.9 8.6 - 10.0 mg/dL    Chloride 103 98 - 107 mmol/L    Glucose 115 (H) 70 - 99 mg/dL    Alkaline Phosphatase 56 40 - 150 U/L    AST 44 0 - 45 U/L    ALT 57 0 - 70 U/L    Protein Total 6.7 6.4 - 8.3 g/dL    Albumin 4.4 3.5 - 5.2 g/dL    Bilirubin Total 1.0 <=1.2 mg/dL   UA with Microscopic reflex to Culture     Status: Abnormal    Specimen: Urine, Midstream   Result Value Ref Range    Color Urine Yellow Colorless, Straw, Light Yellow, Yellow    Appearance Urine Clear Clear    Glucose Urine Negative Negative mg/dL    Bilirubin Urine Negative Negative    Ketones Urine Negative Negative mg/dL    Specific Gravity Urine 1.025 1.003 - 1.035    Blood Urine Negative Negative    pH Urine 6.0 5.0 - 7.0    Protein Albumin Urine 20 (A) Negative mg/dL    Urobilinogen Urine Normal Normal, 2.0 mg/dL    Nitrite Urine Negative Negative    Leukocyte Esterase Urine Negative Negative    Mucus Urine Present (A) None Seen /LPF    RBC Urine 0 <=2 /HPF    WBC Urine 0 <=5 /HPF    Squamous Epithelials Urine <1 <=1 /HPF    Narrative    Urine Culture not indicated   Lipase     Status: Normal   Result Value Ref Range    Lipase 23 13 - 60 U/L   CBC with platelets and differential     Status: Abnormal   Result Value Ref Range    WBC Count 13.5 (H) 4.0 - 11.0 10e3/uL    RBC Count 6.31 (H) 4.40 - 5.90 10e6/uL     Hemoglobin 18.4 (H) 13.3 - 17.7 g/dL    Hematocrit 54.5 (H) 40.0 - 53.0 %    MCV 86 78 - 100 fL    MCH 29.2 26.5 - 33.0 pg    MCHC 33.8 31.5 - 36.5 g/dL    RDW 12.3 10.0 - 15.0 %    Platelet Count 354 150 - 450 10e3/uL    % Neutrophils 90 %    % Lymphocytes 4 %    % Monocytes 5 %    % Eosinophils 1 %    % Basophils 0 %    % Immature Granulocytes 0 %    NRBCs per 100 WBC 0 <1 /100    Absolute Neutrophils 12.0 (H) 1.6 - 8.3 10e3/uL    Absolute Lymphocytes 0.6 (L) 0.8 - 5.3 10e3/uL    Absolute Monocytes 0.7 0.0 - 1.3 10e3/uL    Absolute Eosinophils 0.1 0.0 - 0.7 10e3/uL    Absolute Basophils 0.1 0.0 - 0.2 10e3/uL    Absolute Immature Granulocytes 0.0 <=0.4 10e3/uL    Absolute NRBCs 0.0 10e3/uL   Asymptomatic Influenza A/B, RSV, & SARS-CoV2 PCR (COVID-19) Nose     Status: Normal    Specimen: Nose; Swab   Result Value Ref Range    Influenza A PCR Negative Negative    Influenza B PCR Negative Negative    RSV PCR Negative Negative    SARS CoV2 PCR Negative Negative    Narrative    Testing was performed using the Xpert Xpress CoV2/Flu/RSV Assay on the [employer] GeneXpert Instrument. This test should be ordered for the detection of SARS-CoV-2, influenza, and RSV viruses in individuals who meet clinical and/or epidemiological criteria. Test performance is unknown in asymptomatic patients. This test is for in vitro diagnostic use under the FDA EUA for laboratories certified under CLIA to perform high or moderate complexity testing. This test has not been FDA cleared or approved. A negative result does not rule out the presence of PCR inhibitors in the specimen or target RNA in concentration below the limit of detection for the assay. If only one viral target is positive but coinfection with multiple targets is suspected, the sample should be re-tested with another FDA cleared, approved, or authorized test, if coinfection would change clinical management. This test was validated by the [facility]. These  laboratories are certified under the Clinical Laboratory Improvement Amendments of 1988 (CLIA-88) as qualified to perform high complexity laboratory testing.   CBC with platelets differential     Status: Abnormal    Narrative    The following orders were created for panel order CBC with platelets differential.  Procedure                               Abnormality         Status                     ---------                               -----------         ------                     CBC with platelets and d...[[id number]]  Abnormal            Final result                 Please view results for these tests on the individual orders.     Medications   sodium chloride 0.9 % infusion (1,000 mLs Intravenous $New Bag 11/30/23 1537)   sodium chloride 0.9% BOLUS 1,000 mL (0 mLs Intravenous Stopped 11/30/23 1328)   ondansetron (ZOFRAN) injection 4 mg (4 mg Intravenous $Given 11/30/23 1221)   ketorolac (TORADOL) injection 15 mg (15 mg Intravenous $Given 11/30/23 1335)   sodium chloride 0.9% BOLUS 1,000 mL (0 mLs Intravenous Stopped 11/30/23 1433)   acetaminophen (TYLENOL) tablet 1,000 mg (1,000 mg Oral $Given 11/30/23 1408)   ondansetron (ZOFRAN) injection 4 mg (4 mg Intravenous $Given 11/30/23 1432)     Labs Ordered and Resulted from Time of ED Arrival to Time of ED Departure   COMPREHENSIVE METABOLIC PANEL - Abnormal       Result Value    Sodium 141      Potassium 4.4      Carbon Dioxide (CO2) 28      Anion Gap 10      Urea Nitrogen 14.9      Creatinine 0.89      GFR Estimate >90      Calcium 8.9      Chloride 103      Glucose 115 (*)     Alkaline Phosphatase 56      AST 44      ALT 57      Protein Total 6.7      Albumin 4.4      Bilirubin Total 1.0     ROUTINE UA WITH MICROSCOPIC REFLEX TO CULTURE - Abnormal    Color Urine Yellow      Appearance Urine Clear      Glucose Urine Negative      Bilirubin Urine Negative      Ketones Urine Negative      Specific Gravity Urine 1.025      Blood Urine Negative      pH Urine 6.0       Protein Albumin Urine 20 (*)     Urobilinogen Urine Normal      Nitrite Urine Negative      Leukocyte Esterase Urine Negative      Mucus Urine Present (*)     RBC Urine 0      WBC Urine 0      Squamous Epithelials Urine <1     CBC WITH PLATELETS AND DIFFERENTIAL - Abnormal    WBC Count 13.5 (*)     RBC Count 6.31 (*)     Hemoglobin 18.4 (*)     Hematocrit 54.5 (*)     MCV 86      MCH 29.2      MCHC 33.8      RDW 12.3      Platelet Count 354      % Neutrophils 90      % Lymphocytes 4      % Monocytes 5      % Eosinophils 1      % Basophils 0      % Immature Granulocytes 0      NRBCs per 100 WBC 0      Absolute Neutrophils 12.0 (*)     Absolute Lymphocytes 0.6 (*)     Absolute Monocytes 0.7      Absolute Eosinophils 0.1      Absolute Basophils 0.1      Absolute Immature Granulocytes 0.0      Absolute NRBCs 0.0     LIPASE - Normal    Lipase 23     INFLUENZA A/B, RSV, & SARS-COV2 PCR - Normal    Influenza A PCR Negative      Influenza B PCR Negative      RSV PCR Negative      SARS CoV2 PCR Negative       CT Head w/o Contrast   Final Result   Impression:   No acute intracranial pathology.       I have personally reviewed the examination and initial interpretation   and I agree with the findings.      HENRY PARNELL MD            SYSTEM ID:  [id number]             Critical care was not performed.     Medical Decision Making  The patient's presentation was of moderate complexity (an acute complicated injury).    The patient's evaluation involved:  review of external note(s) from 3+ sources (urgent care notes, medicine notes, internal medicine note)  review of 3+ test result(s) ordered prior to this encounter (CBC, CMP, UA)  ordering and/or review of 3+ test(s) in this encounter (see separate area of note for details)    The patient's management necessitated moderate risk (prescription drug management including medications given in the ED) and high risk (a decision regarding hospitalization).    Assessment & Plan     27-year-old male here for nausea, vomiting.  Additionally noting history of migraines and having a headache which he attributes to dehydration.      The patient noted some transient blurry vision a few days ago which was bilateral and then having a headache.  Patient denies history of auras but symptoms are suggestive of possible migraine with aura.  No current vision changes, no focal neurologic deficits appreciated.  A CT head was done which was unremarkable.    Blood work was obtained and showed slight leukocytosis of 13.5 as well as elevated hemoglobin.  These could be in part due to hemoconcentration due to reported vomiting or leukocytosis may be reactive as well.  Additionally viral illness or other infectious etiologies were considered. Low suspicion for meningitis given lack of fever, meningeal signs, neck pain, or other suggestive symptoms.  Patient did note that while in the emergency room he developed some body aches which is also suggestive of possible viral cause.  Viral swab was obtained.  UA negative.    Patient was given fluids, Zofran and still reported nausea so additional dose of Zofran was ordered. He was also given Tylenol and Toradol.    Viral swab was negative.  Patient received a second dose of Zofran and was still unable to tolerate oral intake.  Patient reports allergies to Reglan and Compazine.  Given inability tolerate oral intake we will plan on observation stay which patient was agreeable to.    I have reviewed the nursing notes. I have reviewed the findings, diagnosis, plan and need for follow up with the patient.    New Prescriptions    No medications on file       Final diagnoses:   Intractable vomiting   Gastroenteritis         [facility] EMERGENCY DEPARTMENT  11/30/2023     Jeffrey Devries MD  11/30/23 9160       Jeffrey Devries MD  11/30/23 3199

## 2023-11-30 NOTE — ED TRIAGE NOTES
Presents from urgent care with 3 days N/V, went away for a little bit, but then came back last night. Denies diarrhea or abdominal pain.    Pt states he had a vision loss episode on Tuesday for approximately 1 hour.   Hx: A. Fib with ablation 2021.  Endorses medical cannabis use a few days ago   Triage Assessment (Adult)       Row Name 11/30/23 1144          Triage Assessment    Airway WDL WDL        Respiratory WDL    Respiratory WDL WDL        Skin Circulation/Temperature WDL    Skin Circulation/Temperature WDL WDL        Cardiac WDL    Cardiac WDL WDL        Peripheral/Neurovascular WDL    Peripheral Neurovascular WDL WDL        Cognitive/Neuro/Behavioral WDL    Cognitive/Neuro/Behavioral WDL WDL

## 2023-11-30 NOTE — MEDICATION SCRIBE - ADMISSION MEDICATION HISTORY
Medication Scribe Admission Medication History    Admission medication history is complete. The information provided in this note is only as accurate as the sources available at the time of the update.    Information Source(s): Patient and CareEverywhere/SureScripts via in-person    Pertinent Information: Patient reports to have finished the Prednisone dose as of currently but not sure if he will be restarting.    Changes made to PTA medication list:  Added: Acetaminophen 500 mg, Melatonin 3 mg, sertraline 100 mg, trazodone 50 mg.  Deleted: Lidocaine 2% solution (duplicate)  Changed: None    Medication Affordability:  Not including over the counter (OTC) medications, was there a time in the past 3 months when you did not take your medications as prescribed because of cost?: No    Allergies reviewed with patient and updates made in EHR: yes    Medication History Completed By: Rosario Youngblood 11/30/2023 5:18 PM    Prior to Admission medications    Medication Sig Last Dose Taking? Auth Provider Long Term End Date   acetaminophen (TYLENOL) 500 MG tablet Take 1,000 mg by mouth every 6 hours as needed for pain Past Week at UNKNOWN Yes Reported, Patient No    albuterol (PROAIR HFA/PROVENTIL HFA/VENTOLIN HFA) 108 (90 Base) MCG/ACT inhaler Inhale 2 puffs into the lungs every 6 hours Past Month at UNKNOWN Yes Abbe Chu NP Yes    amitriptyline (ELAVIL) 10 MG tablet Take 1 tablet (10 mg) by mouth At Bedtime 11/29/2023 at PM Yes Harsha Montero MD Yes    azelastine (ASTELIN) 0.1 % nasal spray Spray 1 spray into both nostrils 2 times daily More than a month at UNKNOWN Yes Anjali Gonzalez CNP     blood glucose (NO BRAND SPECIFIED) test strip Use to test blood sugar 3 times daily or as directed. Accucheck Guide strips More than a month at UNKNOWN Yes Dajuan Sierra DO     blood glucose monitoring (NO BRAND SPECIFIED) meter device kit Use to test blood sugar 3 times daily or as directed. More than a month at UNKNOWN  Yes Dajuan Sierra,      cetirizine-pseudoePHEDrine ER (ZYRTEC-D) 5-120 MG 12 hr tablet Take 1 tablet by mouth 2 times daily Past Month at UNKNOWN Yes Fernando Hazel PA-C     colestipol (COLESTID) 1 g tablet Take 1 tablet (1 g) by mouth 2 times daily 11/30/2023 at AM Yes Abbe Chu NP Yes    diphenhydrAMINE (BENADRYL) 25 MG capsule Take 1 capsule by mouth 11/29/2023 at PM Yes Reported, Patient     eletriptan (RELPAX) 20 MG tablet TAKE ONE TAB AT ONSET OF HEADACHE. MAY REPEAT IN 2HRS X 1 AS NEEDED. MAX 2 TABS IN 24 HRS. Past Week at UNKNOWN Yes Reported, Patient     eptinezumab-jjmr (VYEPTI) 100 MG/ML Inject 100 mg into the vein More than a month Yes Reported, Patient     flecainide (TAMBOCOR) 100 MG tablet Take 100 mg by mouth More than a month at UNKNOWN Yes Reported, Patient Yes    guaiFENesin (MUCINEX) 600 MG 12 hr tablet Take 1,200 mg by mouth Past Month at UNKNOWN Yes Reported, Patient     hyoscyamine SL (LEVSIN/SL) 0.125 MG sublingual tablet Place 0.125 mg under the tongue Past Week at UNKNOWN Yes Reported, Patient     lidocaine, viscous, (XYLOCAINE) 2 % solution Swish and spit 15 mLs in mouth every 3 hours as needed ; Max 8 doses/24 hour period. More than a month at UNKNOWN Yes Fernando Hazel PA-C     loperamide (IMODIUM A-D) 2 MG tablet Take 2 mg by mouth 11/29/2023 at 1200 Yes Reported, Patient     Magnesium Oxide 250 MG TABS ONE BY MOUTH TWICE DAILY TAKE WITH FOOD. 11/29/2023 at AM Yes Reported, Patient     medical cannabis (Patient's own supply) See Admin Instructions (The purpose of this order is to document that the patient reports taking medical cannabis.  This is not a prescription, and is not used to certify that the patient has a qualifying medical condition.) Past Week at UNKNOWN Yes Reported, Patient     melatonin 3 MG tablet Take 3 mg by mouth nightly as needed 11/29/2023 at PM Yes Reported, Patient     metoprolol tartrate (LOPRESSOR) 25 MG tablet 25 mg qAM and 12.5 mg qPM  11/30/2023 at AM Yes Reported, Patient Yes    midodrine (PROAMATINE) 5 MG tablet Take 5 mg by mouth 11/30/2023 at AM Yes Reported, Patient     ondansetron (ZOFRAN ODT) 4 MG ODT tab Take 1 tablet (4 mg) by mouth every 8 hours as needed for nausea Past Week at AM Yes Anjali Gonzalez, CNP     pantoprazole (PROTONIX) 40 MG EC tablet Take 40 mg by mouth 2 times daily More than a month at UNKNOWN Yes Reported, Patient     predniSONE (DELTASONE) 20 MG tablet Take 1 tablet (20 mg) by mouth 2 times daily More than a month at UNKNOWN Yes Fernando Hazel PA-C     Riboflavin 400 MG TABS Take 400 mg by mouth 11/30/2023 at AM Yes Reported, Patient     sertraline (ZOLOFT) 100 MG tablet Take 100 mg by mouth as needed Past Month at UNKNOWN Yes Reported, Patient Yes    terbinafine (LAMISIL) 1 % external cream Apply topically 2 times daily for 30 days 11/29/2023 at PM Yes Berny Bonilla PA-C  12/28/23   traZODone (DESYREL) 50 MG tablet Take 50 mg by mouth at bedtime 11/29/2023 at PM Yes Reported, Patient No

## 2023-11-30 NOTE — Clinical Note
Aron Barbosa was seen and treated in our emergency department on 11/30/2023.  He may return to work on 12/01/2023.  You were seen in the hospital and admitted for observation on 11/30 and then discharged home.      If you have any questions or concerns, please don't hesitate to call.      Cory Rascon MD

## 2023-12-01 ENCOUNTER — TELEPHONE (OUTPATIENT)
Dept: GASTROENTEROLOGY | Facility: CLINIC | Age: 28
End: 2023-12-01

## 2023-12-01 ENCOUNTER — TELEPHONE (OUTPATIENT)
Dept: FAMILY MEDICINE | Facility: CLINIC | Age: 28
End: 2023-12-01
Payer: COMMERCIAL

## 2023-12-01 DIAGNOSIS — R19.7 DIARRHEA, UNSPECIFIED TYPE: Primary | ICD-10-CM

## 2023-12-01 DIAGNOSIS — R10.13 ABDOMINAL PAIN, EPIGASTRIC: ICD-10-CM

## 2023-12-01 NOTE — PLAN OF CARE
Pt wants to go home due to being placed in the garage and not being able to sleep with the lights.  Pt received zofran x1 and finished the liter of normal saline.  Pt tolerated crackers and apple juice.  MD notified and will write discharge orders soon.

## 2023-12-01 NOTE — ED NOTES
Received a call from admitting Team confirming that patient is discharged. Writer informed admitting team too that patient opted not to take his due medications and will take his medications at home because some can make him drowsy.

## 2023-12-01 NOTE — TELEPHONE ENCOUNTER
[facility] Call Center    Phone Message    May a detailed message be left on voicemail: yes     Reason for Call: Order(s): Other:   Reason for requested: C. Diff Test   Date needed: ASAP  Provider name: Dr. Montero    Action Taken: Message routed to:  [facility] ([facility]): GI    Travel Screening: Not Applicable

## 2024-01-09 NOTE — TELEPHONE ENCOUNTER
Harsha Montero MD  You6 days ago     JW  Ok dont see he got stool tests ordered.    He should see Jonathan POWELL!

## 2024-01-11 NOTE — TELEPHONE ENCOUNTER
January 11, 2024    Called Aron Left message and contact info to return call regarding: MD Recommendation    Sent [facility] message.

## 2024-03-10 ENCOUNTER — HEALTH MAINTENANCE LETTER (OUTPATIENT)
Age: 29
End: 2024-03-10

## 2024-11-13 NOTE — LETTER
"April 27, 2023      RE  Aron Barbosa  [address] [address]        To Whom It May Concern:    Aron Barbosa was seen in our clinic. He sustained a head injury last week and is suffering from a concussion. Because of this, Aron has been recommended to be on \"brain rest\" and avoid screens until his symptoms are improving. Of course, this will affect his school work. Please allow deadline extensions for Aron as needed as he works on recovery over the next few weeks to months. Aron has been referred to a concussion specialist for further recommendations.    Sincerely,        Kitty Douglass PA-C    " Personal/family/surgical/social history and medications reviewed. Patient was reminded of due immunizations and screenings.   Patient received influenza immunization today

## 2025-03-16 ENCOUNTER — HEALTH MAINTENANCE LETTER (OUTPATIENT)
Age: 30
End: 2025-03-16

## (undated) DEVICE — MMIS - CATHETER  HEARTLIGHT X3  12FR 110CM

## (undated) DEVICE — MMIS - GUIDEWIRE J 180CM AMPTZ .032IN FLX TP XSTIF SHFT 3

## (undated) DEVICE — MMIS - CATHETER QDPL 5FR 2-5-2MM SPACE CRD CRV 120CM

## (undated) DEVICE — FORCEPS BIOPSY NDL 240CM 2.2MM RJ 4 2.8MM STD CAPACITY STRL

## (undated) DEVICE — MMIS - NEEDLE TRNSPT 19-22GA 71CM CRV C1 RADOPQ NRG

## (undated) DEVICE — KIT ENDO CMPLN ENDOKIT DISP COLON

## (undated) DEVICE — MMIS - CATHETER EP 6FR 2-10MM SPACING CRD CRV 120CM DECA

## (undated) DEVICE — MMIS - PACK SURG EP

## (undated) DEVICE — MMIS - CATHETER MAPPING 105CM 8FR ADVISOR HD SNSR ENABLED

## (undated) DEVICE — MMIS - INTRODUCER SHTH 7FR 50CM 11CM ORNG HUB SNPFT DIL

## (undated) DEVICE — MMIS - INTRODUCER SHTH 8FR 50CM 11CM GW PRELUDE PRO .035

## (undated) DEVICE — MMIS - CATHETER US 8FR 90CM 4W STEER ACNV INTRCRD STRL LF

## (undated) DEVICE — MMIS - CATHETER QDPL TRQ CNTRL 5FR 5MM SPACE COURNAND 2

## (undated) DEVICE — WATER STRL 500ML PLASTIC POUR BTL LF

## (undated) DEVICE — MMIS - SET TBG 2.6MR 2 WAY CMUNC IRR PUMP CATH COOL PT

## (undated) DEVICE — MMIS - SHEATH 4MM 75CM GUIDE DEFLECTABLE LUM HEARTLIGHT

## (undated) DEVICE — MMIS - INTRODUCER SHTH 8FR 67CM 63CM GW HEMOSTASIS VLV SH

## (undated) DEVICE — MMIS - BLOCK BITE 60FR BLOX DISP

## (undated) DEVICE — MMIS - GUIDEWIRE AMP 7CM 180CM STRGT TPR .035IN VASC SS

## (undated) DEVICE — KIT ESCP 5 PC DEFENDO OLYMPUS GI ESCP

## (undated) DEVICE — MMIS - KIT INTRO 4FR 21GA 10CM 4CM .018IN NTNL GW

## (undated) DEVICE — MMIS - INTRODUCER SHTH 6FR 50CM 11CM GRN HUB SNPFT DIL

## (undated) DEVICE — TUBING SCT CLR 20FT .25IN MDVC MAXI-GRIP RGD MALE TO MALE

## (undated) DEVICE — MMIS - KIT ELECTRODE SRFC ENSITE PRCS